# Patient Record
Sex: FEMALE | Race: ASIAN | NOT HISPANIC OR LATINO | Employment: OTHER | URBAN - METROPOLITAN AREA
[De-identification: names, ages, dates, MRNs, and addresses within clinical notes are randomized per-mention and may not be internally consistent; named-entity substitution may affect disease eponyms.]

---

## 2019-04-18 ENCOUNTER — OFFICE VISIT (OUTPATIENT)
Dept: URGENT CARE | Facility: URGENT CARE | Age: 39
End: 2019-04-18
Payer: COMMERCIAL

## 2019-04-18 VITALS
RESPIRATION RATE: 16 BRPM | WEIGHT: 123 LBS | HEIGHT: 60 IN | HEART RATE: 81 BPM | OXYGEN SATURATION: 99 % | TEMPERATURE: 97.6 F | SYSTOLIC BLOOD PRESSURE: 121 MMHG | DIASTOLIC BLOOD PRESSURE: 73 MMHG | BODY MASS INDEX: 24.15 KG/M2

## 2019-04-18 DIAGNOSIS — R76.11 POSITIVE TB TEST: ICD-10-CM

## 2019-04-18 DIAGNOSIS — L50.9 URTICARIA: Primary | ICD-10-CM

## 2019-04-18 DIAGNOSIS — T50.905A ADVERSE EFFECT OF DRUG, INITIAL ENCOUNTER: ICD-10-CM

## 2019-04-18 PROCEDURE — 99204 OFFICE O/P NEW MOD 45 MIN: CPT | Performed by: PHYSICIAN ASSISTANT

## 2019-04-18 RX ORDER — ISONIAZID 100 MG/1
100 TABLET ORAL DAILY
COMMUNITY
End: 2019-08-06

## 2019-04-18 RX ORDER — MULTIVITAMIN WITH IRON
100 TABLET ORAL DAILY
COMMUNITY
End: 2019-08-06

## 2019-04-18 RX ORDER — METHYLPREDNISOLONE 4 MG
TABLET, DOSE PACK ORAL
Qty: 21 TABLET | Refills: 0 | Status: SHIPPED | OUTPATIENT
Start: 2019-04-18 | End: 2019-08-06

## 2019-04-18 RX ORDER — HYDROXYZINE HYDROCHLORIDE 25 MG/1
25 TABLET, FILM COATED ORAL 3 TIMES DAILY PRN
Qty: 30 TABLET | Refills: 0 | Status: SHIPPED | OUTPATIENT
Start: 2019-04-18 | End: 2019-08-06

## 2019-04-18 ASSESSMENT — MIFFLIN-ST. JEOR: SCORE: 1164.42

## 2019-04-18 ASSESSMENT — PAIN SCALES - GENERAL: PAINLEVEL: NO PAIN (0)

## 2019-04-18 NOTE — PATIENT INSTRUCTIONS
(L50.9) Urticaria  (primary encounter diagnosis)  Comment:   Plan: methylPREDNISolone (MEDROL DOSEPAK) 4 MG tablet        therapy pack, hydrOXYzine (ATARAX) 25 MG tablet            (V46.260X) Adverse effect of drug, initial encounter  Comment: the hives (urticaria) are likely secondary to one of your TB medications.   Plan: methylPREDNISolone (MEDROL DOSEPAK) 4 MG tablet        therapy pack    Contact the physician who prescribed your TB medications today to let them know that we suspect you are having an allergy to one of them.  They will need to advise you on how to proceed with your TB treatment (if you should stop one or both of them, or what to do)

## 2019-04-18 NOTE — PROGRESS NOTES
Patient presents with:  Hives: Patient states she has been experiencing itchiness and hives on her whole body times 4 days       SUBJECTIVE:  Antonia De La Cruz is a 38 year old female who presents to the clinic today for an itchy rash, generalized for the past week.  Worsening.    Is on 2 TB drugs for the past month.    She lives in [place], but is in [place] on vacation.  Will be moving to [place] soon however.    She travels back to [place] on 4/21/19    Denies any shortness of breath or difficulty swallowing.    She has taken benadryl with intermittent relief.      No past medical history on file.     PMH:  Recent positive TB test, inactive.      FH:  Denies any significant FH     No Known Allergies  Social History     Tobacco Use     Smoking status: Never Smoker     Smokeless tobacco: Never Used   Substance Use Topics     Alcohol use: Not on file       ROS:  CONSTITUTIONAL:NEGATIVE for fever, chills, change in weight  INTEGUMENTARY/SKIN: as above  EYES: NEGATIVE for vision changes or irritation  ENT/MOUTH: NEGATIVE for ear, mouth and throat problems  RESP:NEGATIVE for significant cough or SOB  CV: NEGATIVE for chest pain, palpitations or peripheral edema  GI: NEGATIVE for nausea, abdominal pain, heartburn, or change in bowel habits  Review of systems negative except as stated above.    EXAM:   /73 (BP Location: Right arm, Patient Position: Sitting, Cuff Size: Adult Regular)   Pulse 81   Temp 97.6  F (36.4  C) (Oral)   Resp 16   Ht 1.524 m (5')   Wt 55.8 kg (123 lb)   SpO2 99%   BMI 24.02 kg/m    GENERAL: alert, no acute distress.    GENERAL APPEARANCE: healthy, alert and no distress  EYES: EOMI,  PERRL, conjunctiva clear  HENT: ear canals and TM's normal.  Nose and mouth without ulcers, erythema or lesions  NECK: supple, non-tender to palpation, no adenopathy noted  RESP: lungs clear to auscultation - no rales, rhonchi or wheezes  CV: regular rates and rhythm, normal S1 S2, no murmur noted  SKIN: scattered  urticarial lesions on arms, back, abdomen    (L50.9) Urticaria  (primary encounter diagnosis)  Comment:   Plan: methylPREDNISolone (MEDROL DOSEPAK) 4 MG tablet        therapy pack, hydrOXYzine (ATARAX) 25 MG tablet            (T51.217H) Adverse effect of drug, initial encounter  Comment: the hives (urticaria) are likely secondary to one of your TB medications.   Plan: methylPREDNISolone (MEDROL DOSEPAK) 4 MG tablet        therapy pack           (R76.11) Positive TB test  Comment:   Plan:   Contact the physician who prescribed your TB medications today to let them know that we suspect you are having an allergy to one of them.  They will need to advise you on how to proceed with your TB treatment (if you should stop one or both of them, or what to do)

## 2019-08-06 ENCOUNTER — OFFICE VISIT (OUTPATIENT)
Dept: URGENT CARE | Facility: URGENT CARE | Age: 39
End: 2019-08-06
Payer: COMMERCIAL

## 2019-08-06 VITALS
SYSTOLIC BLOOD PRESSURE: 114 MMHG | RESPIRATION RATE: 19 BRPM | WEIGHT: 124.31 LBS | DIASTOLIC BLOOD PRESSURE: 70 MMHG | HEART RATE: 75 BPM | TEMPERATURE: 97.6 F | OXYGEN SATURATION: 98 % | BODY MASS INDEX: 24.28 KG/M2

## 2019-08-06 DIAGNOSIS — N39.0 ACUTE UTI: Primary | ICD-10-CM

## 2019-08-06 DIAGNOSIS — R10.9 ABDOMINAL PAIN IN FEMALE: ICD-10-CM

## 2019-08-06 DIAGNOSIS — R53.83 FATIGUE, UNSPECIFIED TYPE: ICD-10-CM

## 2019-08-06 LAB
ALBUMIN SERPL-MCNC: 4.1 G/DL (ref 3.4–5)
ALBUMIN UR-MCNC: NEGATIVE MG/DL
ALP SERPL-CCNC: 96 U/L (ref 40–150)
ALT SERPL W P-5'-P-CCNC: 24 U/L (ref 0–50)
ANION GAP SERPL CALCULATED.3IONS-SCNC: 2 MMOL/L (ref 3–14)
APPEARANCE UR: CLEAR
AST SERPL W P-5'-P-CCNC: 19 U/L (ref 0–45)
BACTERIA #/AREA URNS HPF: ABNORMAL /HPF
BASOPHILS # BLD AUTO: 0 10E9/L (ref 0–0.2)
BASOPHILS NFR BLD AUTO: 0.4 %
BILIRUB SERPL-MCNC: 0.3 MG/DL (ref 0.2–1.3)
BILIRUB UR QL STRIP: NEGATIVE
BUN SERPL-MCNC: 11 MG/DL (ref 7–30)
CALCIUM SERPL-MCNC: 8.9 MG/DL (ref 8.5–10.1)
CHLORIDE SERPL-SCNC: 110 MMOL/L (ref 94–109)
CO2 SERPL-SCNC: 27 MMOL/L (ref 20–32)
COLOR UR AUTO: YELLOW
CREAT SERPL-MCNC: 0.69 MG/DL (ref 0.52–1.04)
DIFFERENTIAL METHOD BLD: NORMAL
EOSINOPHIL # BLD AUTO: 0.1 10E9/L (ref 0–0.7)
EOSINOPHIL NFR BLD AUTO: 0.8 %
ERYTHROCYTE [DISTWIDTH] IN BLOOD BY AUTOMATED COUNT: 13.6 % (ref 10–15)
GFR SERPL CREATININE-BSD FRML MDRD: >90 ML/MIN/{1.73_M2}
GLUCOSE SERPL-MCNC: 68 MG/DL (ref 70–99)
GLUCOSE UR STRIP-MCNC: NEGATIVE MG/DL
HCG UR QL: NEGATIVE
HCT VFR BLD AUTO: 37.9 % (ref 35–47)
HGB BLD-MCNC: 12.1 G/DL (ref 11.7–15.7)
HGB UR QL STRIP: ABNORMAL
HYALINE CASTS #/AREA URNS LPF: ABNORMAL /LPF
KETONES UR STRIP-MCNC: NEGATIVE MG/DL
LEUKOCYTE ESTERASE UR QL STRIP: ABNORMAL
LYMPHOCYTES # BLD AUTO: 2 10E9/L (ref 0.8–5.3)
LYMPHOCYTES NFR BLD AUTO: 28.1 %
MCH RBC QN AUTO: 28.4 PG (ref 26.5–33)
MCHC RBC AUTO-ENTMCNC: 31.9 G/DL (ref 31.5–36.5)
MCV RBC AUTO: 89 FL (ref 78–100)
MONOCYTES # BLD AUTO: 0.9 10E9/L (ref 0–1.3)
MONOCYTES NFR BLD AUTO: 12.1 %
MUCOUS THREADS #/AREA URNS LPF: PRESENT /LPF
NEUTROPHILS # BLD AUTO: 4.3 10E9/L (ref 1.6–8.3)
NEUTROPHILS NFR BLD AUTO: 58.6 %
NITRATE UR QL: NEGATIVE
NON-SQ EPI CELLS #/AREA URNS LPF: ABNORMAL /LPF
PH UR STRIP: 6 PH (ref 5–7)
PLATELET # BLD AUTO: 244 10E9/L (ref 150–450)
POTASSIUM SERPL-SCNC: 4.7 MMOL/L (ref 3.4–5.3)
PROT SERPL-MCNC: 8.3 G/DL (ref 6.8–8.8)
RBC # BLD AUTO: 4.26 10E12/L (ref 3.8–5.2)
RBC #/AREA URNS AUTO: ABNORMAL /HPF
SODIUM SERPL-SCNC: 139 MMOL/L (ref 133–144)
SOURCE: ABNORMAL
SP GR UR STRIP: 1.01 (ref 1–1.03)
SPECIMEN SOURCE: NORMAL
TSH SERPL DL<=0.005 MIU/L-ACNC: 0.72 MU/L (ref 0.4–4)
UROBILINOGEN UR STRIP-ACNC: 0.2 EU/DL (ref 0.2–1)
WBC # BLD AUTO: 7.3 10E9/L (ref 4–11)
WBC #/AREA URNS AUTO: ABNORMAL /HPF
WET PREP SPEC: NORMAL

## 2019-08-06 PROCEDURE — 85025 COMPLETE CBC W/AUTO DIFF WBC: CPT | Performed by: PHYSICIAN ASSISTANT

## 2019-08-06 PROCEDURE — 36415 COLL VENOUS BLD VENIPUNCTURE: CPT | Performed by: PHYSICIAN ASSISTANT

## 2019-08-06 PROCEDURE — 81025 URINE PREGNANCY TEST: CPT | Performed by: PHYSICIAN ASSISTANT

## 2019-08-06 PROCEDURE — 84443 ASSAY THYROID STIM HORMONE: CPT | Performed by: PHYSICIAN ASSISTANT

## 2019-08-06 PROCEDURE — 99214 OFFICE O/P EST MOD 30 MIN: CPT | Performed by: PHYSICIAN ASSISTANT

## 2019-08-06 PROCEDURE — 81001 URINALYSIS AUTO W/SCOPE: CPT | Performed by: PHYSICIAN ASSISTANT

## 2019-08-06 PROCEDURE — 80053 COMPREHEN METABOLIC PANEL: CPT | Performed by: PHYSICIAN ASSISTANT

## 2019-08-06 PROCEDURE — 87210 SMEAR WET MOUNT SALINE/INK: CPT | Performed by: PHYSICIAN ASSISTANT

## 2019-08-06 RX ORDER — NITROFURANTOIN 25; 75 MG/1; MG/1
100 CAPSULE ORAL 2 TIMES DAILY
Qty: 10 CAPSULE | Refills: 0 | Status: SHIPPED | OUTPATIENT
Start: 2019-08-06 | End: 2019-08-11

## 2019-08-06 RX ORDER — PHENAZOPYRIDINE HYDROCHLORIDE 100 MG/1
100 TABLET, FILM COATED ORAL 3 TIMES DAILY PRN
Qty: 12 TABLET | Refills: 0 | Status: SHIPPED | OUTPATIENT
Start: 2019-08-06 | End: 2020-02-19

## 2019-08-06 NOTE — PROGRESS NOTES
"Patient presents with:  Urgent Care: bloated, stomach pain, bladder pain radiating to rectal area, shaky and fatigue off/on for one year.      SUBJECTIVE  HPI:  Antonia De La Cruz is a 39 year old female who presents with the CC of abdominal/pelvic pain.    Complains of lower abdominal pain and urinary frequency for the past couple of weeks.      No vaginal discharge.      Sometimes epigastric pain every night for the past week.      Feels shakey after eating lunch for the past week.  She states that in the past she has been told that she may have times of \"low sugar\" and should eat many small meals throughout the day.  She has NOT been doing this.    Also complains of fatigue over the past few months.  No weight loss.      No rashes.      Denies any nausea or vomiting.    Last BM was within the past day, normal.  No blood.      ,   Using withdrawal for contraception.      LMP 7/15/19    SH: moved to [place] from [place] late last spring.      PMH: completed treatment for positive mantoux.      No past medical history on file.  Current Outpatient Medications   Medication Sig Dispense Refill     nitroFURantoin macrocrystal-monohydrate (MACROBID) 100 MG capsule Take 1 capsule (100 mg) by mouth 2 times daily for 5 days 10 capsule 0     phenazopyridine (PYRIDIUM) 100 MG tablet Take 1 tablet (100 mg) by mouth 3 times daily as needed 12 tablet 0     Social History     Tobacco Use     Smoking status: Never Smoker     Smokeless tobacco: Never Used   Substance Use Topics     Alcohol use: Not on file     FH:   Diabetes in family      ROS:  CONSTITUTIONAL:NEGATIVE for fever, chills, change in weight  INTEGUMENTARY/SKIN: NEGATIVE for worrisome rashes, moles or lesions  ENT/MOUTH: NEGATIVE for ear, mouth and throat problems  RESP:NEGATIVE for significant cough or SOB  CV: NEGATIVE for chest pain, palpitations or peripheral edema  GI: as per HPI  : normal menstrual cycles  MUSCULOSKELETAL: NEGATIVE for significant arthralgias or " myalgia  NEURO: NEGATIVE for weakness, dizziness or paresthesias  ENDOCRINE: NEGATIVE for temperature intolerance, skin/hair changes  HEME/ALLERGY/IMMUNE: NEGATIVE for bleeding problems  Review of systems negative except as stated above.    OBJECTIVE:  /70   Pulse 75   Temp 97.6  F (36.4  C) (Oral)   Resp 19   Wt 56.4 kg (124 lb 5 oz)   SpO2 98%   BMI 24.28 kg/m    GENERAL APPEARANCE: healthy, alert and no distress  EYES: EOMI,  PERRL, conjunctiva clear  HENT: ear canals and TM's normal.  Nose and mouth without ulcers, erythema or lesions  NECK: supple, nontender, no lymphadenopathy  RESP: lungs clear to auscultation - no rales, rhonchi or wheezes  CV: regular rates and rhythm, normal S1 S2, no murmur noted  ABDOMEN:  soft, nontender, no HSM or masses and bowel sounds normal  BACK: no CVAT  NEURO: Normal strength and tone, sensory exam grossly normal,  normal speech and mentation  SKIN: no suspicious lesions or rashes  (N39.0) Acute UTI  (primary encounter diagnosis)  Comment:   Plan: nitroFURantoin macrocrystal-monohydrate         (MACROBID) 100 MG capsule, phenazopyridine         (PYRIDIUM) 100 MG tablet            (R10.9) Abdominal pain in female  Comment: likely secondary to urinary tract infection.    Plan: UA with Microscopic reflex to Culture, Wet         prep, HCG Qual, Urine ([id number]), Comprehensive         metabolic panel (BMP + Alb, Alk Phos, ALT, AST,        Total. Bili, TP), CBC with platelets and         differential            (R53.83) Fatigue, unspecified type  Comment:   Plan: TSH with free T4 reflex          Establish care and follow up with Internal medicine for fatigue and also follow up with Gynecology for pelvic symptoms.      Greater than 50% of the 25 minutes spent face to face with patient was discussing and reviewing the results of diagnostic tests, discussing risks and benefits of management (treatment) options, instruction for management and follow up and importance of  compliance with treatment.        Follow up within the next month.  Sooner should symptoms persist or worsen.

## 2019-08-06 NOTE — PATIENT INSTRUCTIONS
(N39.0) Acute UTI  (primary encounter diagnosis)  Comment:   Plan: nitroFURantoin macrocrystal-monohydrate         (MACROBID) 100 MG capsule, phenazopyridine         (PYRIDIUM) 100 MG tablet            (R10.9) Abdominal pain in female  Comment: likely secondary to urinary tract infection.    Plan: UA with Microscopic reflex to Culture, Wet         prep, HCG Qual, Urine ([id number]), Comprehensive         metabolic panel (BMP + Alb, Alk Phos, ALT, AST,        Total. Bili, TP), CBC with platelets and         differential            (R53.83) Fatigue, unspecified type  Comment:   Plan: TSH with free T4 reflex          Establish care and follow up with Internal medicine for fatigue and also follow up with Gynecology for pelvic symptoms.      Follow up within the next month.  Sooner should symptoms persist or worsen.

## 2019-09-16 ENCOUNTER — OFFICE VISIT (OUTPATIENT)
Dept: URGENT CARE | Facility: URGENT CARE | Age: 39
End: 2019-09-16
Payer: COMMERCIAL

## 2019-09-16 VITALS
BODY MASS INDEX: 23.1 KG/M2 | TEMPERATURE: 98 F | WEIGHT: 118.3 LBS | SYSTOLIC BLOOD PRESSURE: 120 MMHG | OXYGEN SATURATION: 99 % | DIASTOLIC BLOOD PRESSURE: 78 MMHG | HEART RATE: 79 BPM

## 2019-09-16 DIAGNOSIS — H10.33 ACUTE CONJUNCTIVITIS OF BOTH EYES, UNSPECIFIED ACUTE CONJUNCTIVITIS TYPE: Primary | ICD-10-CM

## 2019-09-16 PROCEDURE — 99213 OFFICE O/P EST LOW 20 MIN: CPT | Performed by: FAMILY MEDICINE

## 2019-09-16 RX ORDER — OLOPATADINE HYDROCHLORIDE 1 MG/ML
1 SOLUTION/ DROPS OPHTHALMIC 2 TIMES DAILY
Qty: 1 ML | Refills: 0 | Status: SHIPPED | OUTPATIENT
Start: 2019-09-16 | End: 2020-02-19

## 2019-09-16 RX ORDER — TOBRAMYCIN 3 MG/ML
2 SOLUTION/ DROPS OPHTHALMIC 4 TIMES DAILY
Qty: 3 ML | Refills: 0 | Status: SHIPPED | OUTPATIENT
Start: 2019-09-16 | End: 2020-02-19

## 2019-09-16 NOTE — PROGRESS NOTES
SUBJECTIVE:Chief Complaint:   Chief Complaint   Patient presents with     Urgent Care     Pt states bilateral eye discharge, swelling, redness sxs x last night       History of Present Illness: Antonia De La Cruz is a 39 year old female who presents complaining of both eyes mattering and redness for Today.  Onset/timing: gradual.   Associated Signs and Symptoms: none   Treatment measures tried include: none   Contact wearer : No    No past medical history on file.  No Known Allergies  Social History     Tobacco Use     Smoking status: Never Smoker     Smokeless tobacco: Never Used   Substance Use Topics     Alcohol use: Not on file       ROS:  negative for photophobia, pain, vision change    OBJECTIVE:  /78   Pulse 79   Temp 98  F (36.7  C) (Oral)   Wt 53.7 kg (118 lb 4.8 oz)   SpO2 99%   BMI 23.10 kg/m     General: no acute distress  Eye exam: right eye abnormal findings: conjunctivitis with erythema, discharge and matting noted, left eye abnormal findings: conjunctivitis with erythema, discharge and matting noted.  TAL, EOMI, fundi normal, corneas normal, no foreign bodies, visual acuity normal both eyes, no periorbital cellulitis      ICD-10-CM    1. Acute conjunctivitis of both eyes, unspecified acute conjunctivitis type H10.33 olopatadine (PATANOL) 0.1 % ophthalmic solution     tobramycin (TOBREX) 0.3 % ophthalmic solution

## 2019-11-29 ENCOUNTER — OFFICE VISIT (OUTPATIENT)
Dept: FAMILY MEDICINE | Facility: CLINIC | Age: 39
End: 2019-11-29
Payer: COMMERCIAL

## 2019-11-29 VITALS
DIASTOLIC BLOOD PRESSURE: 84 MMHG | TEMPERATURE: 98.8 F | WEIGHT: 118 LBS | OXYGEN SATURATION: 100 % | SYSTOLIC BLOOD PRESSURE: 112 MMHG | BODY MASS INDEX: 23.05 KG/M2 | RESPIRATION RATE: 12 BRPM | HEART RATE: 77 BPM

## 2019-11-29 DIAGNOSIS — R30.0 DYSURIA: Primary | ICD-10-CM

## 2019-11-29 LAB
BACTERIA #/AREA URNS HPF: ABNORMAL /HPF
CAOX CRY #/AREA URNS HPF: ABNORMAL /HPF
MUCOUS THREADS #/AREA URNS LPF: PRESENT /LPF
NON-SQ EPI CELLS #/AREA URNS LPF: ABNORMAL /LPF
RBC #/AREA URNS AUTO: ABNORMAL /HPF
WBC #/AREA URNS AUTO: ABNORMAL /HPF

## 2019-11-29 PROCEDURE — 81015 MICROSCOPIC EXAM OF URINE: CPT | Performed by: PHYSICIAN ASSISTANT

## 2019-11-29 PROCEDURE — 99214 OFFICE O/P EST MOD 30 MIN: CPT | Performed by: PHYSICIAN ASSISTANT

## 2019-11-29 RX ORDER — SULFAMETHOXAZOLE/TRIMETHOPRIM 800-160 MG
1 TABLET ORAL 2 TIMES DAILY
Qty: 14 TABLET | Refills: 0 | Status: SHIPPED | OUTPATIENT
Start: 2019-11-29 | End: 2020-02-19

## 2019-12-05 ENCOUNTER — OFFICE VISIT (OUTPATIENT)
Dept: URGENT CARE | Facility: URGENT CARE | Age: 39
End: 2019-12-05
Payer: COMMERCIAL

## 2019-12-05 VITALS — OXYGEN SATURATION: 100 % | DIASTOLIC BLOOD PRESSURE: 64 MMHG | HEART RATE: 60 BPM | SYSTOLIC BLOOD PRESSURE: 108 MMHG

## 2019-12-05 DIAGNOSIS — L71.0 PERIORAL DERMATITIS: Primary | ICD-10-CM

## 2019-12-05 PROCEDURE — 99213 OFFICE O/P EST LOW 20 MIN: CPT | Performed by: PHYSICIAN ASSISTANT

## 2019-12-05 ASSESSMENT — ENCOUNTER SYMPTOMS
DIARRHEA: 0
ABDOMINAL PAIN: 0
CHILLS: 0
FOCAL WEAKNESS: 0
NAUSEA: 0
VOMITING: 0
SHORTNESS OF BREATH: 0
FEVER: 0
HEADACHES: 0

## 2019-12-05 NOTE — PATIENT INSTRUCTIONS
What is perioral dermatitis?    Perioral (or periorificial) dermatitis is a common acne or rosacea-like rash that develops around the mouth, nose and eyes of children and young adults.    WHAT CAUSES PERIORAL DERMATITIS?    We don t know the exact cause of perioral dermatitis. Sometimes perioral dermatitis is triggered by steroid medicines that are taken by mouth, applied to the skin or inhaled. One possible cause is an overgrowth of normal skin mites and yeast.    PERIORAL DERMATITIS FACTS      Perioral dermatitis looks like many tiny pink or skin-colored bumps that usually come close to the lips, but don t go onto the lips.    Perioral dermatitis may appear at any age in childhood and adolescence. Girls and boys both get it.    The rash of perioral dermatitis is usually not very bothersome, although it can cause mild itching or burning.    Many people with perioral dermatitis have a history of eczema or asthma. This may be because patients with eczema and asthma need to use steroid medications (and may have skin barrier problems).    Topical steroids may at first seem like they help perioral dermatitis, but the rash often comes back and may even get worse as soon as topical steroids are stopped. Because of this, many people want to start the topical steroids again, but it is important to try to break this cycle.    HOW IS PERIORAL DERMATITIS DIAGNOSED?    Your doctor will be able to diagnose perioral dermatitis by talking with you and doing a careful skin examination. Sometimes tests may be necessary to rule out other causes.    HOW IS PERIORAL DERMATITIS TREATED?    There are many ways to treat perioral dermatitis, and sometimes you need to try several different medications before finding the one that works best for you. The rash needs to be treated for at least 3-6 weeks to fully improve. Your doctor will decide which medications to start with based on how severe the rash is and which treatments have helped  before. The following treatments have all been used to successfully clear perioral dermatitis:    Remove Triggers  If you are using topical steroids to treat perioral dermatitis, you should talk with your doctor about how to stop them. Even with a slow taper, there may be a temporary flare of the rash. If you need inhaled or oral steroids for other health conditions, you should continue them. Take care to keep inhaled or nasal steroids from touching the skin. If they do touch the skin, wipe them off right away. If possible, talk to your doctor about switching from a mask to a spacer to inhale steroids, as this can help avoid contact with the skin.    Topical Antibiotics  Topical antibiotics are usually the starting point in treating perioral dermatitis. Examples of topical antibiotics include metronidazole, clindamycin, erythromycin, sulfacetamide and azelaic acid.    Topical Non-Steroid Anti-Inflammatory Creams  Topical non-steroid anti-inflammatory creams help calm down the inflammation in the skin. Examples are pimecrolimus cream and tacrolimus ointment. Some people say that they feel a mild burning with the first few uses, but this tends to go away.    Anti-Mite Therapies  Anti-mite creams like permethrin or ivermectin may be used to treat perioral dermatitis. Some patients have mild peeling after use.    Oral Antibiotics  If perioral dermatitis is severe or does not respond to topical creams, your doctor may prescribe an oral antibiotic. Oral antibiotics work because they help reduce inflammation. Adults and older children with perioral dermatitis are often treated with tetracyclines, but these are rarely used in children under the age of 8 because they can permanently stain the teeth. Oral antibiotics used for young children are azithromycin, erythromycin and clarithromycin.    WHAT SHOULD BE EXPECTED AFTER TREATMENT?    Even after the rash clears with the right treatment, there is still a chance the perioral  dermatitis may eventually come back. Scars from the rash are unlikely but have been seen in a few patients. Follow up with your doctor regularly and let your doctor know if the rash comes back.      Contributing SPD Members:  Soham Crockett MD, Rojas Casas MD    Committee Reviewers:  John aBrnes MD, Rigoberto Rider MD, Carolee Farrell MD    Expert Reviewer:  Jeremias Diaz MD    The Society for Pediatric Dermat[employer] and [employer] cannot be held responsible for any errors or for any consequences arising from the use of the information contained in this handout. Handout originally published in Pediatric Dermatology: Vol. 34, No. 5 (2017).      2017 The Society for Pediatric Dermatology

## 2019-12-05 NOTE — PROGRESS NOTES
HPI  December 5, 2019    HPI: Antonia De La Cruz is a 39 year old female who complains of rash onset 1 month ago. Rash is localized to area around her mouth. It is not pruritic or painful. Pt denies contacts with anyone with a similar rash. Rash started about 1 month after pt used Plan B and also changed her face wash. She was prescribed Differin which seemed to make it worse, and also tried a product from [employer] with salicylic acid in it which also seemed to make it worse. Symptoms are moderate in severity & constant in duration. Denies throat/tongue swelling, CP, SOB, abd pain, N/VD, sore throat, and any other symptoms.    Prescribed Differin by Corie and also tried product with salicylic acid from [employer] but both seemed to make it worse    No past medical history on file.  No past surgical history on file.  Social History     Tobacco Use     Smoking status: Never Smoker     Smokeless tobacco: Never Used   Substance Use Topics     Alcohol use: None     Drug use: None       Problem list, Medication list, Allergies, and Medical/Social/Surgical histories reviewed in [facility] and updated as appropriate.      Review of Systems   Constitutional: Negative for chills and fever.   Respiratory: Negative for shortness of breath.    Cardiovascular: Negative for chest pain.   Gastrointestinal: Negative for abdominal pain, diarrhea, nausea and vomiting.   Skin: Positive for rash.   Neurological: Negative for focal weakness and headaches.   All other systems reviewed and are negative.        Physical Exam  Vitals signs and nursing note reviewed.   HENT:      Head: Normocephalic and atraumatic.      Mouth/Throat:      Mouth: No oral lesions.   Cardiovascular:      Rate and Rhythm: Normal rate and regular rhythm.      Heart sounds: Normal heart sounds.   Pulmonary:      Effort: Pulmonary effort is normal.      Breath sounds: Normal breath sounds.   Musculoskeletal: Normal range of motion.   Skin:     General: Skin is warm and dry.       Findings: Rash present. Rash is papular and pustular.      Comments: Papules & pustules to perioral area   Neurological:      Mental Status: She is alert and oriented to person, place, and time.       Vital Signs  /64   Pulse 60   SpO2 100%      Diagnostic Test Results:  none     ASSESSMENT/PLAN      ICD-10-CM    1. Perioral dermatitis L71.0 metroNIDAZOLE (METROCREAM) 0.75 % external cream      Suspect perioral dermatitis. Advised avoidance of all products on perioral area except metronidazole cream once daily. Discussed it will likely take several weeks to see improvement.      I have discussed any lab or imaging results, the patient's diagnosis, and my plan of treatment with the patient and/or family. Patient is aware to come back in if with worsening symptoms or if no relief despite treatment plan.  Patient voiced understanding and had no further questions.       Follow Up: Return in about 1 month (around 1/5/2020) for Follow up w/ primary care provider if not better.    LYNDA Escudero, PA-C  [facility]

## 2020-02-19 ENCOUNTER — OFFICE VISIT (OUTPATIENT)
Dept: INTERNAL MEDICINE | Facility: CLINIC | Age: 40
End: 2020-02-19
Payer: COMMERCIAL

## 2020-02-19 ENCOUNTER — RESULT FOLLOW UP (OUTPATIENT)
Dept: INTERNAL MEDICINE | Facility: CLINIC | Age: 40
End: 2020-02-19

## 2020-02-19 VITALS
WEIGHT: 119.6 LBS | OXYGEN SATURATION: 99 % | BODY MASS INDEX: 24.11 KG/M2 | SYSTOLIC BLOOD PRESSURE: 112 MMHG | DIASTOLIC BLOOD PRESSURE: 78 MMHG | RESPIRATION RATE: 16 BRPM | HEART RATE: 83 BPM | HEIGHT: 59 IN

## 2020-02-19 DIAGNOSIS — Z12.4 SCREENING FOR CERVICAL CANCER: ICD-10-CM

## 2020-02-19 DIAGNOSIS — Z00.00 ROUTINE HISTORY AND PHYSICAL EXAMINATION OF ADULT: Primary | ICD-10-CM

## 2020-02-19 DIAGNOSIS — Z78.9 VARICELLA VACCINATION STATUS UNKNOWN: ICD-10-CM

## 2020-02-19 DIAGNOSIS — Z78.9 MEASLES, MUMPS, RUBELLA (MMR) VACCINATION STATUS UNKNOWN: ICD-10-CM

## 2020-02-19 DIAGNOSIS — Z13.220 SCREENING FOR CHOLESTEROL LEVEL: ICD-10-CM

## 2020-02-19 DIAGNOSIS — Z23 NEED FOR TETANUS BOOSTER: ICD-10-CM

## 2020-02-19 DIAGNOSIS — Z78.9 HEPATITIS B VACCINATION STATUS UNKNOWN: ICD-10-CM

## 2020-02-19 DIAGNOSIS — Z11.1 SCREENING EXAMINATION FOR PULMONARY TUBERCULOSIS: ICD-10-CM

## 2020-02-19 DIAGNOSIS — Z13.1 SCREENING FOR DIABETES MELLITUS: ICD-10-CM

## 2020-02-19 DIAGNOSIS — Z11.4 SCREENING FOR HUMAN IMMUNODEFICIENCY VIRUS WITHOUT PRESENCE OF RISK FACTORS: ICD-10-CM

## 2020-02-19 DIAGNOSIS — Z30.09 BIRTH CONTROL COUNSELING: ICD-10-CM

## 2020-02-19 LAB — HBV SURFACE AB SERPL IA-ACNC: 183.29 M[IU]/ML

## 2020-02-19 PROCEDURE — 86580 TB INTRADERMAL TEST: CPT | Performed by: INTERNAL MEDICINE

## 2020-02-19 PROCEDURE — 86762 RUBELLA ANTIBODY: CPT | Performed by: INTERNAL MEDICINE

## 2020-02-19 PROCEDURE — 90471 IMMUNIZATION ADMIN: CPT | Performed by: INTERNAL MEDICINE

## 2020-02-19 PROCEDURE — 99395 PREV VISIT EST AGE 18-39: CPT | Mod: 25 | Performed by: INTERNAL MEDICINE

## 2020-02-19 PROCEDURE — 87624 HPV HI-RISK TYP POOLED RSLT: CPT | Performed by: INTERNAL MEDICINE

## 2020-02-19 PROCEDURE — G0145 SCR C/V CYTO,THINLAYER,RESCR: HCPCS | Performed by: INTERNAL MEDICINE

## 2020-02-19 PROCEDURE — 90715 TDAP VACCINE 7 YRS/> IM: CPT | Performed by: INTERNAL MEDICINE

## 2020-02-19 PROCEDURE — 86735 MUMPS ANTIBODY: CPT | Performed by: INTERNAL MEDICINE

## 2020-02-19 PROCEDURE — 86787 VARICELLA-ZOSTER ANTIBODY: CPT | Performed by: INTERNAL MEDICINE

## 2020-02-19 PROCEDURE — 86706 HEP B SURFACE ANTIBODY: CPT | Performed by: INTERNAL MEDICINE

## 2020-02-19 PROCEDURE — 36415 COLL VENOUS BLD VENIPUNCTURE: CPT | Performed by: INTERNAL MEDICINE

## 2020-02-19 PROCEDURE — 86765 RUBEOLA ANTIBODY: CPT | Performed by: INTERNAL MEDICINE

## 2020-02-19 ASSESSMENT — MIFFLIN-ST. JEOR: SCORE: 1120.19

## 2020-02-19 NOTE — PROGRESS NOTES
SUBJECTIVE                                                      HPI: Antonia De La Cruz is a pleasant 39 year old female who presents for a physical.    No specific complaints, concerns, or questions.    ROS:  Constitutional: denies unintentional weight loss or gain; denies fevers, chills, or sweats     Cardiovascular: denies chest pain, palpitations, or edema  Respiratory: denies cough, wheezing, shortness of breath, or dyspnea on exertion  Gastrointestinal: denies nausea, vomiting, constipation, diarrhea, or abdominal pain  Genitourinary: denies urinary frequency, urgency, dysuria, or hematuria  Integumentary: denies rash or pruritus  Musculoskeletal: denies back pain, muscle pain, joint pain, or joint swelling  Neurologic: denies focal weakness, numbness, or tingling  Hematologic/Immunologic: denies history of anemia or blood transfusions  Endocrine: denies heat or cold intolerance; denies polyuria, polydipsia  Psychiatric: denies anxiety; see preventative health below    Past Medical History:   Diagnosis Date     NO ACTIVE PROBLEMS      Past Surgical History:   Procedure Laterality Date     NO HISTORY OF SURGERY       Family History   Problem Relation Age of Onset     Diabetes Type 2  Father      Hypertension Father      Hyperlipidemia Father      Myocardial Infarction No family hx of      Cerebrovascular Disease No family hx of      Coronary Artery Disease Early Onset No family hx of      Breast Cancer No family hx of      Ovarian Cancer No family hx of      Colon Cancer No family hx of      Social History     Occupational History     Occupation:       Comment: [employer]   Tobacco Use     Smoking status: Never Smoker     Smokeless tobacco: Never Used   Substance and Sexual Activity     Alcohol use: Not Currently     Drug use: Not Currently     Sexual activity: Yes     Partners: Male     Birth control/protection: Pull-out method   Social History Narrative     ".    Two kids (10 and 8 as of 2020).    Walks regularly.      No Known Allergies     MEDS: None    Immunization History   Administered Date(s) Administered     Influenza Vaccine IM > 6 months Valent IIV4 11/12/2019     OBJECTIVE                                                      /78   Pulse 83   Resp 16   Ht 1.486 m (4' 10.5\")   Wt 54.3 kg (119 lb 9.6 oz)   LMP 02/07/2020 (Exact Date)   SpO2 99%   BMI 24.57 kg/m    Constitutional: well-appearing  Eyes: normal conjunctivae and lids; pupils equal, round, and reactive to light  Ears, Nose, Mouth, and Throat: normal ears and nose; tympanic membranes visualized and normal; normal lips, teeth, and gums; no oropharyngeal lesions or ulcers  Neck: supple and symmetric; no lymphadenopathy; no thyromegaly or masses  Respiratory: normal respiratory effort; clear to auscultation bilaterally  Cardiovascular: regular rate and rhythm; pedal pulses palpable; no edema  Gastrointestinal: soft, non-tender, non-distended, and bowel sounds present; no organomegaly or masses  Musculoskeletal: normal gait and station  Psych: normal judgment and insight; normal mood and affect; recent and remote memory intact; oriented to time, place, and person    PREVENTATIVE HEALTH                                                      BMI: within normal limits   Blood pressure: within normal limits   Breast CA screening: not medically indicated at this time   Cervical CA screening: DUE  Colon CA screening: not medically indicated at this time   Lung CA screening: n/a   Dexa: not medically indicated at this time   Screening HCV: n/a   Screening HIV: DUE  Screening cholesterol: DUE  Screening diabetes: DUE  STD testing: no risk factors present  Depression screening: PHQ-2 assessment completed and reviewed - no intervention indicated at this time  Alcohol misuse screening: alcohol use reviewed - no intervention indicated at this time  Immunizations: reviewed; TDAP DUE    ASSESSMENT/PLAN "                                                       (Z00.00) Routine history and physical examination of adult  (primary encounter diagnosis)  Comment: PMH, PSH, FH, SH, medications, allergies, immunizations, and preventative health measures reviewed.   Plan: PMH, PSH, FH, SH, medications, allergies, immunizations, and preventative health measures reviewed.     (Z12.4) Screening for cervical cancer  Plan: pap smear obtained today.    (Z23) Need for tetanus booster  Plan: TDAP given today.     (Z11.1) Screening examination for pulmonary tuberculosis  Plan: PPD placed today; nurse read in 48-72 hours.    (Z78.9) Hepatitis B vaccination status unknown  (Z78.9) Measles, mumps, rubella (MMR) vaccination status unknown  (Z78.9) Varicella vaccination status unknown  Plan: titers today.    (Z13.220) Screening for cholesterol level  (Z13.1) Screening for diabetes mellitus  (Z11.4) Screening for human immunodeficiency virus without presence of risk factors  Plan: patient will return for fasting lab when able.     (Z30.09) Birth control counseling  Plan: referred to ob/gyn to discuss tubal ligation.     The instructions on the AVS were discussed and explained to the patient. Patient expressed understanding of instructions.    (Chart documentation was completed, in part, with [employer] voice-recognition software. Even though reviewed, some grammatical, spelling, and word errors may remain.)    Nora Morataya MD   [address] [address]  T: [phone], F: [phone]

## 2020-02-19 NOTE — PATIENT INSTRUCTIONS
Tetanus booster today.    ---    Labs downstairs today.    Schedule fasting labs while you're down there.    ---    Pap smear results take ~1 week to come back.     ---    Ob/gyn referral placed - please call to schedule.

## 2020-02-20 LAB
MEV IGG SER QL IA: 4.8 AI (ref 0–0.8)
MUV IGG SER QL IA: 1.8 AI (ref 0–0.8)
RUBV IGG SERPL IA-ACNC: 60 IU/ML
VZV IGG SER QL IA: 2.6 AI (ref 0–0.8)

## 2020-02-21 ENCOUNTER — ANCILLARY PROCEDURE (OUTPATIENT)
Dept: GENERAL RADIOLOGY | Facility: CLINIC | Age: 40
End: 2020-02-21
Attending: INTERNAL MEDICINE
Payer: COMMERCIAL

## 2020-02-21 ENCOUNTER — ALLIED HEALTH/NURSE VISIT (OUTPATIENT)
Dept: NURSING | Facility: CLINIC | Age: 40
End: 2020-02-21
Payer: COMMERCIAL

## 2020-02-21 DIAGNOSIS — Z11.1 SCREENING EXAMINATION FOR PULMONARY TUBERCULOSIS: ICD-10-CM

## 2020-02-21 DIAGNOSIS — Z11.1 SCREENING EXAMINATION FOR PULMONARY TUBERCULOSIS: Primary | ICD-10-CM

## 2020-02-21 LAB
COPATH REPORT: NORMAL
PAP: NORMAL
PPDINDURATION: 15 MM (ref 0–5)
PPDREDNESS: 20 MM

## 2020-02-21 PROCEDURE — 71046 X-RAY EXAM CHEST 2 VIEWS: CPT

## 2020-02-21 PROCEDURE — 99207 ZZC NO CHARGE NURSE ONLY: CPT

## 2020-02-21 NOTE — PROGRESS NOTES
"Mantoux results: Induration. Swelling.Redness noted to Left Forearm    Patient reports history of \"positive mantoux\".  She has finished INH treatment some time last year- around March, 2019. This was completed in [place], [place] by her previous employer ([employer])    Patient also noted to have left deltoid skin livier and recalls she was given vaccine as a child when in the [facility].    Message handled by Nurse Triage with [facility] - provider name: myesha Carmona for chest x-ray.      Margarita WALDRONN, RN, PHN        "

## 2020-02-25 ENCOUNTER — TELEPHONE (OUTPATIENT)
Dept: INTERNAL MEDICINE | Facility: CLINIC | Age: 40
End: 2020-02-25

## 2020-02-25 DIAGNOSIS — Z11.4 SCREENING FOR HUMAN IMMUNODEFICIENCY VIRUS WITHOUT PRESENCE OF RISK FACTORS: ICD-10-CM

## 2020-02-25 DIAGNOSIS — Z13.1 SCREENING FOR DIABETES MELLITUS: ICD-10-CM

## 2020-02-25 DIAGNOSIS — Z13.220 SCREENING FOR CHOLESTEROL LEVEL: ICD-10-CM

## 2020-02-25 LAB
ALBUMIN SERPL-MCNC: 3.6 G/DL (ref 3.4–5)
ALP SERPL-CCNC: 113 U/L (ref 40–150)
ALT SERPL W P-5'-P-CCNC: 42 U/L (ref 0–50)
ANION GAP SERPL CALCULATED.3IONS-SCNC: 5 MMOL/L (ref 3–14)
AST SERPL W P-5'-P-CCNC: 25 U/L (ref 0–45)
BILIRUB SERPL-MCNC: 0.2 MG/DL (ref 0.2–1.3)
BUN SERPL-MCNC: 12 MG/DL (ref 7–30)
CALCIUM SERPL-MCNC: 8.7 MG/DL (ref 8.5–10.1)
CHLORIDE SERPL-SCNC: 107 MMOL/L (ref 94–109)
CHOLEST SERPL-MCNC: 178 MG/DL
CO2 SERPL-SCNC: 26 MMOL/L (ref 20–32)
CREAT SERPL-MCNC: 0.53 MG/DL (ref 0.52–1.04)
FINAL DIAGNOSIS: ABNORMAL
GFR SERPL CREATININE-BSD FRML MDRD: >90 ML/MIN/{1.73_M2}
GLUCOSE SERPL-MCNC: 92 MG/DL (ref 70–99)
HDLC SERPL-MCNC: 58 MG/DL
HPV HR 12 DNA CVX QL NAA+PROBE: POSITIVE
HPV16 DNA SPEC QL NAA+PROBE: NEGATIVE
HPV18 DNA SPEC QL NAA+PROBE: NEGATIVE
LDLC SERPL CALC-MCNC: 102 MG/DL
NONHDLC SERPL-MCNC: 120 MG/DL
POTASSIUM SERPL-SCNC: 3.8 MMOL/L (ref 3.4–5.3)
PROT SERPL-MCNC: 7.6 G/DL (ref 6.8–8.8)
SODIUM SERPL-SCNC: 138 MMOL/L (ref 133–144)
SPECIMEN DESCRIPTION: ABNORMAL
SPECIMEN SOURCE CVX/VAG CYTO: ABNORMAL
TRIGL SERPL-MCNC: 88 MG/DL

## 2020-02-25 PROCEDURE — 80061 LIPID PANEL: CPT | Performed by: INTERNAL MEDICINE

## 2020-02-25 PROCEDURE — 87389 HIV-1 AG W/HIV-1&-2 AB AG IA: CPT | Performed by: INTERNAL MEDICINE

## 2020-02-25 PROCEDURE — 80053 COMPREHEN METABOLIC PANEL: CPT | Performed by: INTERNAL MEDICINE

## 2020-02-25 PROCEDURE — 36415 COLL VENOUS BLD VENIPUNCTURE: CPT | Performed by: INTERNAL MEDICINE

## 2020-02-25 NOTE — TELEPHONE ENCOUNTER
School Physical Exam form    Reason for Call:  Form, our goal is to have forms completed with 72 hours, however, some forms may require a visit or additional information.    Type of letter, form or note:  medical    Who is the form from?: Patient    Where did the form come from: Patient or family brought in       What clinic location was the form placed at?: Internal Medicine    Where the form was placed: Dr. Morataya's Box/Folder    What number is listed as a contact on the form?: [phone]       Additional comments: call when done for     Call taken on 2/25/2020 at 9:57 AM by Karyn Colon

## 2020-02-25 NOTE — TELEPHONE ENCOUNTER
The patient wants to be notified asap when the form is ready, she needs it for school.  And she wants to know her x-ray results asap too re her mantoux.

## 2020-02-26 LAB — HIV 1+2 AB+HIV1 P24 AG SERPL QL IA: NONREACTIVE

## 2020-02-26 NOTE — PROGRESS NOTES
2/19/20 NIL pap, + HR HPV (not 16/18). Plan 1 year cotest  02/26/20 Called pt - she was not available to talk. Results sent in [employer] per pt request - viewed by pt

## 2020-03-27 ENCOUNTER — VIRTUAL VISIT (OUTPATIENT)
Dept: FAMILY MEDICINE | Facility: OTHER | Age: 40
End: 2020-03-27

## 2020-03-27 NOTE — PROGRESS NOTES
"Date: 2020 07:57:58  Clinician: Kacy Mchugh  Clinician NPI: [id number]  Patient: Antonia De La Cruz  Patient : 1980  [address] [place] [address] [place] [address]  Patient Phone: [phone]  Visit Protocol: URI  Patient Summary:  Antonia is a 39 year old ( : 1980 ) female who initiated a Visit for COVID-19 (Coronavirus) evaluation and screening. When asked the question \"Please sign me up to receive news, health information and promotions. \", Antonia responded \"Yes\".    Antonia states her symptoms started gradually 10-13 days ago.   Her symptoms consist of a cough and myalgia.   Symptom details   Cough: Antonia coughs a few times an hour and her cough is not more bothersome at night. Phlegm does not come into her throat when she coughs. She does not believe her cough is caused by post-nasal drip.    Antonia denies having ear pain, rhinitis, wheezing, sore throat, nasal congestion, malaise, enlarged lymph nodes, teeth pain, headache, fever, facial pain or pressure, and chills. She also denies taking antibiotic medication for the symptoms, having recent facial or sinus surgery in the past 60 days, and double sickening (worsening symptoms after initial improvement). She is not experiencing dyspnea.   Precipitating events  She has not recently been exposed to someone with influenza. Antonia has been in close contact with the following high risk individuals: immunocompromised people, people with asthma, heart disease or diabetes, and adults 65 or older.   Pertinent COVID-19 (Coronavirus) information  Antonia has not traveled internationally or to the areas where COVID-19 (Coronavirus) is widespread, including cruise ship travel in the last 14 days before the start of her symptoms.   Antonia has not had a close contact with a laboratory-confirmed COVID-19 patient within 14 days of symptom onset. She also has not had a close contact with a suspected COVID-19 patient within 14 days of symptom " onset.   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   Pertinent medical history  Antonia does not get yeast infections when she takes antibiotics.   Antonia does not need a return to work/school note.   Weight: 120 lbs   Antonia does not smoke or use smokeless tobacco.   She denies pregnancy and denies breastfeeding. She has menstruated in the past month.   Weight: 120 lbs    MEDICATIONS: Tylenol Extra Strength oral, ALLERGIES: NKDA  Clinician Response:  Dear Antonia,   Based on the information you have provided, you do have symptoms that are consistent with Coronavirus (COVID-19).  The coronavirus causes mild to severe respiratory illness with the most common symptoms including fever, cough and difficulty breathing. Unfortunately, many viruses cause similar symptoms and it can be difficult to distinguish between viruses, especially in mild cases, so we are presuming that anyone with cough or fever has coronavirus at this time.  Coronavirus/COVID-19 has reached the point of community spread in [place], meaning that we are finding the virus in people with no known exposure risk for annika the virus. Given the increasing commonness of coronavirus in the community we are no longer testing patients who are not critically ill.  If you are a health care worker, you should refer to your employee health office for instructions about testing and returning to work.  For everyone else who has cough or fever, you should assume you are infected with coronavirus. Since you will not be tested but have symptoms that may be consistent with coronavirus, the CDC recommends you stay in self-isolation until these three things have happened:    You have had no fever for at least 72 hours (that is three full days of no fever without the use of medicine that reduces fevers)    AND   Other symptoms have improved  (for example, when your cough or shortness of breath have improved)   AND   At least 7 days have passed since your symptoms first appeared.   How to Isolate:   Isolate yourself at home.  Do Not allow any visitors  Do Not go to work or school  Do Not go to [employer],  centers, shopping, or other public places.  Do Not shake hands.  Avoid close contact with others (hugging, kissing).   Protect Others:   Cover Your Mouth and Nose with a mask, disposable tissue or wash cloth to avoid spreading germs to others.  Wash your hands and face frequently with soap and water.   We know it can be scary to hear that you might have COVID-19. Our team can help track your symptoms and make sure you are doing ok over the next two weeks using a program called [employer] to keep in touch. When you receive an email from [employer], please consider enrolling in our monitoring program. There is no cost to you for monitoring. Here is a URL where you can learn more: [email or web address]  Managing Symptoms:   At this time, we primarily recommend Tylenol (Acetaminophen) for fever or pain. If you have liver or kidney problems, contact your primary care provider for instructions on use of tylenol. Adults can take 650 mg (two 325 mg pills) by mouth every 4-6 hours as needed OR 1,000 mg (two 500 mg pills) every 8 hours as needed. MAXIMUM DAILY DOSE: 3,000mg. For children, refer to dosing on bottle based on age or weight.   If you develop significant shortness of breath that prevents you from doing normal activities, please call 911 or proceed to the nearest emergency room and alert them immediately that you have been in self-isolation for possible coronavirus.  If you have a higher risk medical condition such as cancer, heart failure, end stage renal disease on dialysis or have a transplant, please reach out to your specialist's clinic to advise them of your [facility] visit should you not improve within the next two days.   For  more information about COVID19 and options for caring for yourself at home, please visit the CDC website at https://www.cdc.gov/coronavirus/2019-ncov/about/steps-when-sick.htmlFor more options for care at [facility], please visit our website at [email or web address]    Diagnosis: Cough  Diagnosis ICD: R05  Additional Clinician Notes: There is an option to sign up for the Get Well Loop. these are providers who check in with you daily and can answer questions you may have and could treat you if needed. I would suggest accepting their services, which are free.

## 2020-03-27 NOTE — PROGRESS NOTES
"Date: 2020 08:23:14  Clinician: Shi Martinez  Clinician NPI: [id number]  Patient: Antonia De La Cruz  Patient : 1980  Patient Address: [address] [place] [address]  Patient Phone: [phone]  Visit Protocol: URI  Patient Summary:  Antonia is a 39 year old ( : 1980 ) female who initiated a Visit for COVID-19 (Coronavirus) evaluation and screening. When asked the question \"Please sign me up to receive news, health information and promotions. \", Antonia responded \"No\".    Antonia states her symptoms started gradually 10-13 days ago. After her symptoms started, they improved and then got worse again.   Her symptoms consist of a cough and myalgia.   Symptom details   Cough: Antonia coughs a few times an hour and her cough is not more bothersome at night. Phlegm does not come into her throat when she coughs. She does not believe her cough is caused by post-nasal drip.    Antonia denies having ear pain, rhinitis, wheezing, sore throat, nasal congestion, malaise, enlarged lymph nodes, teeth pain, headache, fever, facial pain or pressure, and chills. She also denies taking antibiotic medication for the symptoms and having recent facial or sinus surgery in the past 60 days. She is not experiencing dyspnea.   Precipitating events  She has not recently been exposed to someone with influenza. Antonia has been in close contact with the following high risk individuals: immunocompromised people, people with asthma, heart disease or diabetes, and adults 65 or older.   Pertinent COVID-19 (Coronavirus) information  Antonia has not traveled internationally or to the areas where COVID-19 (Coronavirus) is widespread, including cruise ship travel in the last 14 days before the start of her symptoms.   Antonia has not had a close contact with a laboratory-confirmed COVID-19 patient within 14 days of symptom onset. She also has not had a close contact with a suspected COVID-19 patient within 14 days of " symptom onset.   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 She provides direct patient care. She does not live with a healthcare worker.   Pertinent medical history  Antonia does not get yeast infections when she takes antibiotics.   Antonia does not need a return to work/school note.   Weight: 120 lbs   Antonia does not smoke or use smokeless tobacco.   She denies pregnancy and denies breastfeeding. She has menstruated in the past month.   Weight: 120 lbs    MEDICATIONS: No current medications, ALLERGIES: NKDA  Clinician Response:  Dear Antonia,   Based on the information you have provided, you do have symptoms that are consistent with Coronavirus (COVID-19).   The coronavirus causes mild to severe respiratory illness with the most common symptoms including fever, cough and difficulty breathing. Unfortunately, many viruses cause similar symptoms and it can be difficult to distinguish between viruses, especially in mild cases, so we are presuming that anyone with cough or fever has coronavirus at this time.  Coronavirus/COVID-19 has reached the point of community spread in [place], meaning that we are finding the virus in people with no known exposure risk for annika the virus. Given the increasing commonness of coronavirus in the community we are no longer testing patients who are not critically ill.  If you are a health care worker, you should refer to your employee health office for instructions about testing and returning to work.  For everyone else who has cough or fever, you should assume you are infected with coronavirus. Since you will not be tested but have symptoms that may be consistent with coronavirus, the CDC recommends you stay in self-isolation until these three things have happened:    You have had no fever for at least 72 hours (that is three full days of no fever without the use of medicine that reduces fevers)    AND   Other symptoms have  improved (for example, when your cough or shortness of breath have improved)   AND   At least 7 days have passed since your symptoms first appeared.   How to Isolate:    Isolate yourself at home.   Do Not allow any visitors  Do Not go to work or school  Do Not go to [employer],  centers, shopping, or other public places.  Do Not shake hands.  Avoid close contact with others (hugging, kissing).   Protect Others:    Cover Your Mouth and Nose with a mask, disposable tissue or wash cloth to avoid spreading germs to others.  Wash your hands and face frequently with soap and water.   Managing Symptoms:    At this time, we primarily recommend Tylenol (Acetaminophen) for fever or pain. If you have liver or kidney problems, contact your primary care provider for instructions on use of tylenol. Adults can take 650 mg (two 325 mg pills) by mouth every 4-6 hours as needed OR 1,000 mg (two 500 mg pills) every 8 hours as needed. MAXIMUM DAILY DOSE: 3,000mg. For children, refer to dosing on bottle based on age or weight.   If you develop significant shortness of breath that prevents you from doing normal activities, please call 911 or proceed to the nearest emergency room and alert them immediately that you have been in self-isolation for possible coronavirus.   If you have a higher risk medical condition such as cancer, heart failure, end stage renal disease on dialysis or have a transplant, please reach out to your specialist's clinic to advise them of your [facility] visit should you not improve within the next two days.  For more information about COVID19 and options for caring for yourself at home, please visit the CDC website at https://www.cdc.gov/coronavirus/2019-ncov/about/steps-when-sick.htmlFor more options for care at [facility], please visit our website at [email or web address]     Diagnosis: Cough  Diagnosis ICD: R05

## 2020-03-30 ENCOUNTER — TELEPHONE (OUTPATIENT)
Dept: FAMILY MEDICINE | Facility: OTHER | Age: 40
End: 2020-03-30

## 2020-03-30 DIAGNOSIS — R05.9 COUGH: Primary | ICD-10-CM

## 2020-03-30 RX ORDER — BENZONATATE 100 MG/1
100 CAPSULE ORAL 3 TIMES DAILY PRN
Qty: 21 CAPSULE | Refills: 0 | Status: SHIPPED | OUTPATIENT
Start: 2020-03-30 | End: 2020-03-30

## 2020-03-30 RX ORDER — BENZONATATE 100 MG/1
100 CAPSULE ORAL 3 TIMES DAILY PRN
Qty: 21 CAPSULE | Refills: 0 | Status: SHIPPED | OUTPATIENT
Start: 2020-03-30 | End: 2020-04-28

## 2020-03-30 RX ORDER — BENZONATATE 100 MG/1
100 CAPSULE ORAL 3 TIMES DAILY PRN
COMMUNITY
End: 2020-03-30

## 2020-03-30 NOTE — TELEPHONE ENCOUNTER
Discussed with Antonia in [facility] that she continues to have persistent cough and is out of robitussin. She went to the pharmacy and was unable to find more. Her cough is keeping her up at night and very bothersome and is not improving with hot tea, honey, cold medicines, or throat lozenges. Will send prescription for tessalon perles to her pharmacy. She is also having headaches and we discussed taking tylenol 1000mg q6h prn. She appreciated the recommendations.     Shae TERAN  [facility]/Get [facility] Loop

## 2020-04-26 ENCOUNTER — MYC MEDICAL ADVICE (OUTPATIENT)
Dept: INTERNAL MEDICINE | Facility: CLINIC | Age: 40
End: 2020-04-26

## 2020-04-27 NOTE — TELEPHONE ENCOUNTER
[employer] message sent to patient asking her to request an appointment with her provider.

## 2020-04-28 ENCOUNTER — VIRTUAL VISIT (OUTPATIENT)
Dept: INTERNAL MEDICINE | Facility: CLINIC | Age: 40
End: 2020-04-28
Payer: COMMERCIAL

## 2020-04-28 DIAGNOSIS — N89.8 VAGINAL DISCHARGE: ICD-10-CM

## 2020-04-28 DIAGNOSIS — R10.2 PELVIC PAIN IN FEMALE: Primary | ICD-10-CM

## 2020-04-28 DIAGNOSIS — R10.13 EPIGASTRIC PAIN: ICD-10-CM

## 2020-04-28 DIAGNOSIS — R30.0 DYSURIA: ICD-10-CM

## 2020-04-28 PROCEDURE — 99214 OFFICE O/P EST MOD 30 MIN: CPT | Mod: TEL | Performed by: INTERNAL MEDICINE

## 2020-04-28 RX ORDER — OMEPRAZOLE 40 MG/1
40 CAPSULE, DELAYED RELEASE ORAL DAILY
Qty: 90 CAPSULE | Refills: 0 | Status: SHIPPED | OUTPATIENT
Start: 2020-04-28 | End: 2023-11-22

## 2020-04-28 NOTE — PROGRESS NOTES
"Antonia De La Cruz is a 39 year old female who is being evaluated via a billable telephone visit.      The patient has been notified of following:     \"This telephone visit will be conducted via a call between you and your physician/provider. We have found that certain health care needs can be provided without the need for an in-person physical exam.  This service lets us provide the care you need with a telephone conversation.  If a prescription is necessary we can send it directly to your pharmacy.  If lab work is needed we can place an order for that and you can then stop by our lab to have the test done at a later time.    Telephone visits are billed at different rates depending on your insurance coverage.  Please reach out to your insurance provider with any questions.    If during the course of the call the physician/provider feels a telephone visit is not appropriate, you will not be charged for this service.\"    Patient has given verbal consent for telephone visit? Yes    How would you like to obtain your AVS? MyChart     TELEPHONE VISIT                                                      SUBJECTIVE:                                                      HPI: Antonia De La Cruz is a pleasant 39 year old female who requested a telephone visit to discuss multiple symptoms:    Most symptoms have been ongoing for the last several days to weeks. Symptoms include pelvic and epigastric pain. Patient describes her pelvic pain as \"being connected to her rectum.\" Patient describes her epigastric pain as worse after eating with associated bloating/gassiness. Symptoms also include dysuria, heavy vaginal discharge, and dyspareunia. Symptoms also include nausea, excessive fatigue, and subjective fevers (no subjective fevers today yet).    No vomiting.  No urinary frequency, urgency, or hematuria.  No vaginal irritation or itching.  No unusual vaginal color or odor.  No diarrhea, constipation, melena, or hematochezia.  No anorexia, " unintentional weight loss, or night sweats.  No cough, shortness of breath, or chest tightness.    No significant active medical problems.  Patient is in a long-term monogamous relationship with .  Patient's most recent Pap smear was performed 2 months ago: Normal cytology, positive HR HPV (not 16 or 18); repeat cotesting recommended in 1 year.    ASSESSMENT/PLAN:                                                      (R10.2) Pelvic pain in female  (primary encounter diagnosis)  (N89.8) Vaginal discharge  (R30.0) Dysuria  Comment: etiology unclear; may be related to dysuria and/or abnormal vaginal discharge.  Plan:    - patient will come in tomorrow for CBC, CMP, UA reflex, and GC/C and wet prep self swabs.   - recommendations to follow.    (R10.13) Epigastric pain  Comment: etiology unclear, but suspect dyspepsia contributing.  Plan:    - patient will come in tomorrow for CBC, CMP, and abdominal x-ray series.     - START omeprazole 40 mg daily and consistently.    Total time of call between patient and provider was 15 minutes.    (Chart documentation was completed, in part, with [facility] voice-recognition software. Even though reviewed, some grammatical, spelling, and word errors may remain.)    Nora Morataya MD   [address] [address]  T: [phone], F: [phone]

## 2020-04-29 ENCOUNTER — ANCILLARY PROCEDURE (OUTPATIENT)
Dept: GENERAL RADIOLOGY | Facility: CLINIC | Age: 40
End: 2020-04-29
Attending: INTERNAL MEDICINE
Payer: COMMERCIAL

## 2020-04-29 DIAGNOSIS — R10.2 PELVIC PAIN IN FEMALE: ICD-10-CM

## 2020-04-29 DIAGNOSIS — N89.8 VAGINAL DISCHARGE: ICD-10-CM

## 2020-04-29 DIAGNOSIS — R10.13 EPIGASTRIC PAIN: ICD-10-CM

## 2020-04-29 DIAGNOSIS — D64.9 ANEMIA, UNSPECIFIED TYPE: ICD-10-CM

## 2020-04-29 DIAGNOSIS — D64.9 ANEMIA, UNSPECIFIED TYPE: Primary | ICD-10-CM

## 2020-04-29 DIAGNOSIS — R30.0 DYSURIA: ICD-10-CM

## 2020-04-29 LAB
ALBUMIN SERPL-MCNC: 3.5 G/DL (ref 3.4–5)
ALBUMIN UR-MCNC: NEGATIVE MG/DL
ALP SERPL-CCNC: 105 U/L (ref 40–150)
ALT SERPL W P-5'-P-CCNC: 38 U/L (ref 0–50)
ANION GAP SERPL CALCULATED.3IONS-SCNC: 5 MMOL/L (ref 3–14)
APPEARANCE UR: CLEAR
AST SERPL W P-5'-P-CCNC: 35 U/L (ref 0–45)
BASOPHILS # BLD AUTO: 0 10E9/L (ref 0–0.2)
BASOPHILS NFR BLD AUTO: 0.2 %
BILIRUB SERPL-MCNC: 0.3 MG/DL (ref 0.2–1.3)
BILIRUB UR QL STRIP: NEGATIVE
BUN SERPL-MCNC: 17 MG/DL (ref 7–30)
CALCIUM SERPL-MCNC: 8.5 MG/DL (ref 8.5–10.1)
CHLORIDE SERPL-SCNC: 109 MMOL/L (ref 94–109)
CO2 SERPL-SCNC: 25 MMOL/L (ref 20–32)
COLOR UR AUTO: YELLOW
CREAT SERPL-MCNC: 0.58 MG/DL (ref 0.52–1.04)
DIFFERENTIAL METHOD BLD: ABNORMAL
EOSINOPHIL # BLD AUTO: 0.1 10E9/L (ref 0–0.7)
EOSINOPHIL NFR BLD AUTO: 0.7 %
ERYTHROCYTE [DISTWIDTH] IN BLOOD BY AUTOMATED COUNT: 14.7 % (ref 10–15)
FERRITIN SERPL-MCNC: 9 NG/ML (ref 12–150)
GFR SERPL CREATININE-BSD FRML MDRD: >90 ML/MIN/{1.73_M2}
GLUCOSE SERPL-MCNC: 86 MG/DL (ref 70–99)
GLUCOSE UR STRIP-MCNC: NEGATIVE MG/DL
HCT VFR BLD AUTO: 33.7 % (ref 35–47)
HGB BLD-MCNC: 10.8 G/DL (ref 11.7–15.7)
HGB UR QL STRIP: NEGATIVE
IRON SATN MFR SERPL: 12 % (ref 15–46)
IRON SERPL-MCNC: 47 UG/DL (ref 35–180)
KETONES UR STRIP-MCNC: NEGATIVE MG/DL
LEUKOCYTE ESTERASE UR QL STRIP: NEGATIVE
LYMPHOCYTES # BLD AUTO: 1.4 10E9/L (ref 0.8–5.3)
LYMPHOCYTES NFR BLD AUTO: 17.1 %
MCH RBC QN AUTO: 28.6 PG (ref 26.5–33)
MCHC RBC AUTO-ENTMCNC: 32 G/DL (ref 31.5–36.5)
MCV RBC AUTO: 89 FL (ref 78–100)
MONOCYTES # BLD AUTO: 0.8 10E9/L (ref 0–1.3)
MONOCYTES NFR BLD AUTO: 10.3 %
NEUTROPHILS # BLD AUTO: 5.8 10E9/L (ref 1.6–8.3)
NEUTROPHILS NFR BLD AUTO: 71.7 %
NITRATE UR QL: NEGATIVE
PH UR STRIP: 6 PH (ref 5–7)
PLATELET # BLD AUTO: 239 10E9/L (ref 150–450)
POTASSIUM SERPL-SCNC: 3.9 MMOL/L (ref 3.4–5.3)
PROT SERPL-MCNC: 7.8 G/DL (ref 6.8–8.8)
RBC # BLD AUTO: 3.77 10E12/L (ref 3.8–5.2)
SODIUM SERPL-SCNC: 139 MMOL/L (ref 133–144)
SOURCE: NORMAL
SP GR UR STRIP: >1.03 (ref 1–1.03)
SPECIMEN SOURCE: NORMAL
TIBC SERPL-MCNC: 384 UG/DL (ref 240–430)
UROBILINOGEN UR STRIP-ACNC: 0.2 EU/DL (ref 0.2–1)
WBC # BLD AUTO: 8.1 10E9/L (ref 4–11)
WET PREP SPEC: NORMAL

## 2020-04-29 PROCEDURE — 87491 CHLMYD TRACH DNA AMP PROBE: CPT | Performed by: INTERNAL MEDICINE

## 2020-04-29 PROCEDURE — 74019 RADEX ABDOMEN 2 VIEWS: CPT

## 2020-04-29 PROCEDURE — 87210 SMEAR WET MOUNT SALINE/INK: CPT | Performed by: INTERNAL MEDICINE

## 2020-04-29 PROCEDURE — 83540 ASSAY OF IRON: CPT | Performed by: INTERNAL MEDICINE

## 2020-04-29 PROCEDURE — 85025 COMPLETE CBC W/AUTO DIFF WBC: CPT | Performed by: INTERNAL MEDICINE

## 2020-04-29 PROCEDURE — 87591 N.GONORRHOEAE DNA AMP PROB: CPT | Performed by: INTERNAL MEDICINE

## 2020-04-29 PROCEDURE — 81003 URINALYSIS AUTO W/O SCOPE: CPT | Performed by: INTERNAL MEDICINE

## 2020-04-29 PROCEDURE — 36415 COLL VENOUS BLD VENIPUNCTURE: CPT | Performed by: INTERNAL MEDICINE

## 2020-04-29 PROCEDURE — 80053 COMPREHEN METABOLIC PANEL: CPT | Performed by: INTERNAL MEDICINE

## 2020-04-29 PROCEDURE — 82728 ASSAY OF FERRITIN: CPT | Performed by: INTERNAL MEDICINE

## 2020-04-29 PROCEDURE — 83550 IRON BINDING TEST: CPT | Performed by: INTERNAL MEDICINE

## 2020-04-30 LAB
C TRACH DNA SPEC QL NAA+PROBE: NEGATIVE
N GONORRHOEA DNA SPEC QL NAA+PROBE: NEGATIVE
SPECIMEN SOURCE: NORMAL
SPECIMEN SOURCE: NORMAL

## 2020-09-15 ENCOUNTER — DOCUMENTATION ONLY (OUTPATIENT)
Dept: INTERNAL MEDICINE | Facility: CLINIC | Age: 40
End: 2020-09-15

## 2020-09-15 DIAGNOSIS — D50.0 IRON DEFICIENCY ANEMIA DUE TO CHRONIC BLOOD LOSS: Primary | ICD-10-CM

## 2020-09-16 ENCOUNTER — TELEPHONE (OUTPATIENT)
Dept: INTERNAL MEDICINE | Facility: CLINIC | Age: 40
End: 2020-09-16

## 2020-09-16 DIAGNOSIS — Z11.1 SCREENING EXAMINATION FOR PULMONARY TUBERCULOSIS: Primary | ICD-10-CM

## 2020-09-16 NOTE — TELEPHONE ENCOUNTER
Spoke with patient and informed that order was placed. Patient gave verbal understanding. I also scheduled patient for Xray for Friday.

## 2020-09-16 NOTE — TELEPHONE ENCOUNTER
Reason for Call: Request for an order or referral:    Order or referral being requested: CXR for school (instead of mantoux test)    Date needed: as soon as possible    Has the patient been seen by the PCP for this problem? NO    Additional comments: Pt tests positive to mantoux tests due [place]. Please call pt when the CXR has been ordered.    Phone number Patient can be reached at:  Home number on file [phone] (home)    Best Time:  anytime    Can we leave a detailed message on this number?  YES    Call taken on 9/16/2020 at 9:09 AM by SATHYA ZIEGLER

## 2020-09-16 NOTE — TELEPHONE ENCOUNTER
Dermatology:  Dr. Yuan--[place], [place]  [address] [facility]   Office: [phone]      Mammogram due Nov 2020  Please schedule mammogram, you may call their contact number at [phone]. They are located at [address] [place] [address], [place] [address]    Labs today while you are here    Continue current medications    Formula 33 on the toenail.                                                                  Medicare Wellness Visit  Plan for Preventive Care    A good way for you to stay healthy is to use preventive care.  Medicare covers many services that can help you stay healthy.* The goal of these services is to find any health problems as quickly as possible. Finding problems early can help make them easier to treat.  Your personal plan below lists the services you may need and when they are due.     Health Maintenance Summary     Shingles Vaccine (2 of 3)  Overdue since 7/3/2012    Medicare Wellness 65+ (Yearly)  Overdue since 3/13/2020    Depression Screening (Yearly)  Overdue since 3/21/2020    Breast Cancer Screening (Every 2 Years)  Next due on 11/11/2021    DTaP/Tdap/Td Vaccine (2 - Td)  Next due on 5/25/2023    Colorectal Cancer Screening-Colonoscopy (Every 10 Years)  Next due on 12/16/2026    Pneumococcal Vaccine 65+   Completed    Osteoporosis Screening   Completed    Hepatitis C Screening   Completed    Influenza Vaccine   Completed    Hepatitis B Vaccine   Aged Out    Meningococcal Vaccine   Aged Out           Preventive Care for Women and Men    Heart Screenings (Cardiovascular):  · Blood tests are used to check your cholesterol, lipid and triglyceride levels. High levels can increase your risk for heart disease and stroke. High levels can be treated with medications, diet and exercise. Lowering your levels can help keep your heart and blood vessels healthy.  Your provider will order these tests if they are needed.    · An ultrasound is done to see if you have an abdominal aortic aneurysm (AAA).  Please advise    This is an enlargement of one of the main blood vessels that delivers blood to the body.   In the [place], 9,000 deaths are caused by AAA.  You may not even know you have this problem and as many as 1 in 3 people will have a serious problem if it is not treated.  Early diagnosis allows for more effective treatment and cure.  If you have a family history of AAA or are a male age 65-75 who has smoked, you are at higher risk of an AAA.  Your provider can order this test, if needed.    Colorectal Screening:  · There are many tests that are used to check for cancer of your colon and rectum. You and your provider should discuss what test is best for you and when to have it done.  Options include:  · Screening Colonoscopy: exam of the entire colon, seen through a flexible lighted tube.  · Flexible Sigmoidoscopy: exam of the last third (sigmoid portion) of the colon and rectum, seen through a flexible lighted tube.  · Cologuard DNA stool test: a sample of your stool is used to screen for cancer and unseen blood in your stool.  · Fecal Occult Blood Test: a sample of your stool is studied to find any unseen blood    Flu Shot:  · An immunization that helps to prevent influenza (the flu). You should get this every year. The best time to get the shot is in the fall.    Pneumococcal Shot:  • Vaccines are available that can help prevent pneumococcal disease, which is any type of infection caused by Streptococcus pneumoniae bacteria.   Their use can prevent some cases of pneumonia, meningitis, and sepsis. There are two types of pneumococcal vaccines:   o Conjugate vaccines (PCV-13 or Prevnar 13®) - helps protect against the 13 types of pneumococcal bacteria that are the most common causes of serious infections in children and adults.    o Polysaccharide vaccine (PPSV23 or Einqgvohs37®) - helps protect against 23 types of pneumococcal bacteria for patients who are recommended to get it.  These vaccines should be given at  least 12 months apart.  A booster is usually not needed.     Hepatitis B Shot:  · An immunization that helps to protect people from getting Hepatitis B. Hepatitis B is a virus that spreads through contact with infected blood or body fluids. Many people with the virus do not have symptoms.  The virus can lead to serious problems, such as liver disease. Some people are at higher risk than others. Your doctor will tell you if you need this shot.     Diabetes Screening:  · A test to measure sugar (glucose) in your blood is called a fasting blood sugar. Fasting means you cannot have food or drink for at least 8 hours before the test. This test can detect diabetes long before you may notice symptoms.    Glaucoma Screening:  · Glaucoma screening is performed by your eye doctor. The test measures the fluid pressure inside your eyes to determine if you have glaucoma.     Hepatitis C Screening:  · A blood test to see if you have the hepatitis C virus.  Hepatitis C attacks the liver and is a major cause of chronic liver disease.  Medicare will cover a single screening for all adults born between 1945 & 1965, or high risk patients (people who have injected illegal drugs or people who have had blood transfusions).  High risk patients who continue to inject illegal drugs can be screened for Hepatitis C every year.    Smoking and Tobacco-Use Cessation Counseling:  · Tobacco is the single greatest cause of disease and early death in our country today. Medication and counseling together can increase a person’s chance of quitting for good.   · Medicare covers two quitting attempts per year, with four counseling sessions per attempt (eight sessions in a 12 month period)    Preventive Screening tests for Women    Screening Mammograms and Breast Exams:  · An x-ray of your breasts to check for breast cancer before you or your doctor may be able to feel it.  If breast cancer is found early it can usually be treated with success.    Pelvic  Exams and Pap Tests:  · An exam to check for cervical and vaginal cancer. A Pap test is a lab test in which cells are taken from your cervix and sent to the lab to look for signs of cervical cancer. If cancer of the cervix is found early, chances for a cure are good. Testing can generally end at age 65, or if a woman has a hysterectomy for a benign condition. Your provider may recommend more frequent testing if certain abnormal results are found.    Bone Mass Measurements:  · A painless x-ray of your bone density to see if you are at risk for a broken bone. Bone density refers to the thickness of bones or how tightly the bone tissue is packed.    Preventive Screening tests for Men    Prostate Screening:  · Should you have a prostate cancer test (PSA)?  It is up to you to decide if you want a prostate cancer test. Talk to your clinician to find out if the test is right for you.  Things for you to consider and talk about should include:  · Benefits and harms of the test  · Your family history  · How your race/ethnicity may influence the test  · If the test may impact other medical conditions you have  · Your values on screenings and treatments    *Medicare pays for many preventive services to keep you healthy. For some of these services, you might have to pay a deductible, coinsurance, and / or copayment.  The amounts vary depending on the type of services you need and the kind of Medicare health plan you have.

## 2020-09-18 DIAGNOSIS — Z11.1 SCREENING EXAMINATION FOR PULMONARY TUBERCULOSIS: ICD-10-CM

## 2020-09-18 PROCEDURE — 71046 X-RAY EXAM CHEST 2 VIEWS: CPT

## 2020-09-21 ENCOUNTER — OFFICE VISIT (OUTPATIENT)
Dept: URGENT CARE | Facility: URGENT CARE | Age: 40
End: 2020-09-21
Payer: COMMERCIAL

## 2020-09-21 VITALS
RESPIRATION RATE: 20 BRPM | OXYGEN SATURATION: 98 % | BODY MASS INDEX: 24.45 KG/M2 | TEMPERATURE: 98.5 F | SYSTOLIC BLOOD PRESSURE: 129 MMHG | HEART RATE: 67 BPM | DIASTOLIC BLOOD PRESSURE: 91 MMHG | WEIGHT: 119 LBS

## 2020-09-21 DIAGNOSIS — H60.392 INFECTIVE OTITIS EXTERNA, LEFT: ICD-10-CM

## 2020-09-21 DIAGNOSIS — H61.22 IMPACTED CERUMEN OF LEFT EAR: ICD-10-CM

## 2020-09-21 DIAGNOSIS — H92.02 OTALGIA, LEFT: Primary | ICD-10-CM

## 2020-09-21 PROCEDURE — 99213 OFFICE O/P EST LOW 20 MIN: CPT | Mod: 25 | Performed by: FAMILY MEDICINE

## 2020-09-21 PROCEDURE — 69209 REMOVE IMPACTED EAR WAX UNI: CPT | Mod: LT | Performed by: FAMILY MEDICINE

## 2020-09-21 RX ORDER — NEOMYCIN SULFATE, POLYMYXIN B SULFATE AND HYDROCORTISONE 10; 3.5; 1 MG/ML; MG/ML; [USP'U]/ML
3 SUSPENSION/ DROPS AURICULAR (OTIC) 4 TIMES DAILY
Qty: 6 ML | Refills: 0 | Status: SHIPPED | OUTPATIENT
Start: 2020-09-21 | End: 2020-10-01

## 2020-09-21 NOTE — PROGRESS NOTES
SUBJECTIVE:Antonia De La Cruz is a 40 year old female who presents with left ear pain and waxfor day(s).   Severity: mild and moderate   Timing:still present  Additional symptoms include none.    History of recurrent otitis: no    Past Medical History:   Diagnosis Date     Cervical high risk HPV (human papillomavirus) test positive 2/19/2020    See problem list     History of positive PPD     treated with INH in 2019     NO ACTIVE PROBLEMS      No Known Allergies  Social History     Tobacco Use     Smoking status: Never Smoker     Smokeless tobacco: Never Used   Substance Use Topics     Alcohol use: Not Currently       ROS: CONSTITUTIONAL:NEGATIVE for fever, chills, change in weight    OBJECTIVE:  BP (!) 129/91   Pulse 67   Temp 98.5  F (36.9  C) (Temporal)   Resp 20   Wt 54 kg (119 lb)   LMP  (LMP Unknown)   SpO2 98%   BMI 24.45 kg/m     The right TM is normal: no effusions, no erythema, and normal landmarks     The right auditory canal is normal and without drainage, edema or erythema  The left TM is not visualized secondary to cerumen  The left auditory canal is erythematous, swollen, tender  Oropharynx exam is normal: no lesions, erythema, adenopathy or exudate.GENERAL: no acute distress  EYES: EOMI,  PERRL, conjunctiva clear  SKIN: no suspicious lesions or rashes       ICD-10-CM    1. Otalgia, left  H92.02    2. Impacted cerumen of left ear  H61.22 HC REMOVAL IMPACTED CERUMEN IRRIGATION/LVG UNILAT     Nursing Communication 1   3. Infective otitis externa, left  H60.392 neomycin-polymyxin-hydrocortisone (CORTISPORIN) [id number] otic suspension       F/U PCP/IM/FP/UC if worse, not any better

## 2020-10-02 ENCOUNTER — VIRTUAL VISIT (OUTPATIENT)
Dept: FAMILY MEDICINE | Facility: OTHER | Age: 40
End: 2020-10-02

## 2020-10-02 NOTE — PROGRESS NOTES
"Date: 10/02/2020 14:46:49  Clinician: Chayo Ogden  Clinician NPI: [id number]  Patient: Antonia De La Cruz  Patient : 1980  [place] [address] [place] [address]  Patient Phone: [phone]  Visit Protocol: URI  Patient Summary:  Antonia is a 40 year old ( : 1980 ) female who initiated a [facility] Visit for COVID-19 (Coronavirus) evaluation and screening. When asked the question \"Please sign me up to receive news, health information and promotions. \", Antonia responded \"No\".    Antonia states her symptoms started today.   Her symptoms consist of myalgia, chills, and malaise.   Antonia denies having ear pain, headache, wheezing, fever, cough, nasal congestion, anosmia, vomiting, rhinitis, nausea, facial pain or pressure, sore throat, teeth pain, ageusia, and diarrhea. She also denies taking antibiotic medication in the past month and having recent facial or sinus surgery in the past 60 days. She is not experiencing dyspnea.   Precipitating events  She has not recently been exposed to someone with influenza. Antonia has been in close contact with the following high risk individuals: adults 65 or older.   Pertinent COVID-19 (Coronavirus) information  In the past 14 days, Antonia has not worked in a congregate living setting.   She either works or volunteers as a healthcare worker or a , or works or volunteers in a healthcare facility. 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 She lives with a healthcare worker.   Antonia has not had a close contact with a laboratory-confirmed COVID-19 patient within 14 days of symptom onset.   Since 2019, Antonia and has not had upper respiratory infection or influenza-like illness. Has not been diagnosed with lab-confirmed COVID-19 test   Pertinent medical " history  Antonia does not get yeast infections when she takes antibiotics.   Antonia needs a return to work/school note.   Weight: 126 lbs   Antonia does not smoke or use smokeless tobacco.   She denies pregnancy and denies breastfeeding. She is currently menstruating.   Weight: 126 lbs    MEDICATIONS: No current medications, ALLERGIES: NKDA  Clinician Response:  Dear Antonia,   Your symptoms show that you may have coronavirus (COVID-19). This illness can cause fever, cough and trouble breathing. Many people get a mild case and get better on their own. Some people can get very sick.  Based on the symptoms you have shared, I would like you to be re-checked in 2 to 3 days. Please call your family clinic to set up a video or phone visit.  Will I be tested for COVID-19?  We would like to test you for this virus.   Please call [phone] to schedule your visit. Explain that you were referred by [facility] to have a COVID-19 test. Be ready to share [facility] number.   The following will serve as your written order for this COVID Test, ordered by me, for the indication of suspected COVID [Z20.828]: The test will be ordered in [facility], our electronic health record, after you are scheduled. It will show as ordered and authorized by Valentin Dey MD.  Order: COVID-19 (Coronavirus) PCR for SYMPTOMATIC testing from [facility].  1.When it's time for your COVID test:   Stay at least 6 feet away from others. (If someone will drive you to your test, stay in the backseat, as far away from the  as you can.)   Cover your mouth and nose with a mask, tissue or washcloth.  Go straight to the testing site. Don't make any stops on the way there or back.      2.Starting now: Stay home and away from others (self-isolate) until:   You've had no fever---and no medicine that reduces fever---for one full day (24 hours). And...   Your other symptoms have gotten better. For example, your cough or breathing has improved. And...   At least 10  "days have passed since your symptoms started.       During this time, don't leave the house except for testing or medical care.   Stay in your own room, even for meals. Use your own bathroom if you can.   Stay away from others in your home. No hugging, kissing or shaking hands. No visitors.  Don't go to work, school or anywhere else.    Clean \"high touch\" surfaces often (doorknobs, counters, handles, etc.). Use a household cleaning spray or wipes. You'll find a full list of  on the EPA website: www.epa.gov/pesticide-registration/list-n-disinfectants-use-against-sars-cov-2.   Cover your mouth and nose with a mask, tissue or washcloth to avoid spreading germs.  Wash your hands and face often. Use soap and water.  Caregivers in these groups are at risk for severe illness due to COVID-19:  o People 65 years and older  o People who live in a nursing home or long-term care facility  o People with chronic disease (lung, heart, cancer, diabetes, kidney, liver, immunologic)   o People who have a weakened immune system, including those who:   Are in cancer treatment  Take medicine that weakens the immune system, such as corticosteroids  Had a bone marrow or organ transplant  Have an immune deficiency  Have poorly controlled HIV or AIDS  Are obese (body mass index of 40 or higher)  Smoke regularly   o Caregivers should wear gloves while washing dishes, handling laundry and cleaning bedrooms and bathrooms.  o Use caution when washing and drying laundry: Don't shake dirty laundry, and use the warmest water setting that you can.  o For more tips, go to www.cdc.gov/coronavirus/2019-ncov/downloads/10Things.pdf.      How can I take care of myself?   Get lots of rest. Drink extra fluids (unless a doctor has told you not to)   Take Tylenol (acetaminophen) for fever or pain. If you have liver or kidney problems, ask your family doctor if it's okay to take Tylenol.   Adults can take either:    650 mg (two 325 mg pills) every 4 " to 6 hours, or...   1,000 mg (two 500 mg pills) every 8 hours as needed.    Note: Don't take more than 3,000 mg in one day. Acetaminophen is found in many medicines (both prescribed and over-the-counter medicines). Read all labels to be sure you don't take too much.   For children, check the Tylenol bottle for the right dose. The dose is based on the child's age or weight.    If you have other health problems (like cancer, heart failure, an organ transplant or severe kidney disease): Call your specialty clinic if you don't feel better in the next 2 days.       Know when to call 911. Emergency warning signs include:    Trouble breathing or shortness of breath Pain or pressure in the chest that doesn't go away Feeling confused like you haven't felt before, or not being able to wake up Bluish-colored lips or face  Where can I get more information?   [facility] -- About COVID-19: [email or web address]   CDC -- What to Do If You're Sick: www.cdc.gov/coronavirus/2019-ncov/about/steps-when-sick.html   CDC -- Ending Home Isolation: www.cdc.gov/coronavirus/2019-ncov/hcp/disposition-in-home-patients.html   CDC -- Caring for Someone: www.cdc.gov/coronavirus/2019-ncov/if-you-are-sick/care-for-someone.html   [facility] -- Interim Guidance for Hospital Discharge to Home: www.health.[facility].[place]/diseases/coronavirus/hcp/hospdischarge.pdf   [facility] clinical trials (COVID-19 research studies): clinicalaffairs.[facility]/umn-clinical-trials    Below are the COVID-19 hotlines at the [place] Department of Health ([facility]). Interpreters are available.    For health questions: Call 283-545-4666 or 1-996.944.7510 (7 a.m. to 7 p.m.) For questions about schools and childcare: Call 267-982-5720 or 1-112.554.9630 (7 a.m. to 7 p.m.)       Diagnosis: Myalgia, unspecified site  Diagnosis ICD: M79.10

## 2020-11-21 ENCOUNTER — OFFICE VISIT (OUTPATIENT)
Dept: URGENT CARE | Facility: URGENT CARE | Age: 40
End: 2020-11-21
Payer: COMMERCIAL

## 2020-11-21 ENCOUNTER — NURSE TRIAGE (OUTPATIENT)
Dept: NURSING | Facility: CLINIC | Age: 40
End: 2020-11-21

## 2020-11-21 VITALS
HEART RATE: 63 BPM | OXYGEN SATURATION: 100 % | DIASTOLIC BLOOD PRESSURE: 86 MMHG | SYSTOLIC BLOOD PRESSURE: 131 MMHG | RESPIRATION RATE: 14 BRPM | TEMPERATURE: 96.6 F

## 2020-11-21 DIAGNOSIS — R07.9 CHEST PAIN, UNSPECIFIED TYPE: Primary | ICD-10-CM

## 2020-11-21 DIAGNOSIS — F41.9 ANXIETY: ICD-10-CM

## 2020-11-21 PROCEDURE — 99213 OFFICE O/P EST LOW 20 MIN: CPT | Performed by: PHYSICIAN ASSISTANT

## 2020-11-21 PROCEDURE — 93000 ELECTROCARDIOGRAM COMPLETE: CPT | Performed by: PHYSICIAN ASSISTANT

## 2020-11-21 RX ORDER — HYDROXYZINE PAMOATE 50 MG/1
50 CAPSULE ORAL 3 TIMES DAILY PRN
Qty: 60 CAPSULE | Refills: 0 | Status: SHIPPED | OUTPATIENT
Start: 2020-11-21 | End: 2023-11-24

## 2020-11-21 RX ORDER — HYDROXYZINE PAMOATE 50 MG/1
50 CAPSULE ORAL 3 TIMES DAILY PRN
Qty: 60 CAPSULE | Refills: 0 | Status: SHIPPED | OUTPATIENT
Start: 2020-11-21 | End: 2020-11-21

## 2020-11-21 ASSESSMENT — ENCOUNTER SYMPTOMS
AGITATION: 0
CONFUSION: 0
CONSTITUTIONAL NEGATIVE: 1
LIGHT-HEADEDNESS: 0
HEADACHES: 0
SEIZURES: 0
NUMBNESS: 0
DIZZINESS: 0
DYSPHORIC MOOD: 0
SPEECH DIFFICULTY: 0
WHEEZING: 0
HYPERACTIVE: 0
PALPITATIONS: 1
COUGH: 0
WEAKNESS: 0
APNEA: 0
HALLUCINATIONS: 0
TREMORS: 1
GASTROINTESTINAL NEGATIVE: 1
NERVOUS/ANXIOUS: 1
DECREASED CONCENTRATION: 0
CHEST TIGHTNESS: 1
EYES NEGATIVE: 1
SHORTNESS OF BREATH: 0
SLEEP DISTURBANCE: 1
FACIAL ASYMMETRY: 0
MUSCULOSKELETAL NEGATIVE: 1
CHOKING: 0
STRIDOR: 0

## 2020-11-21 NOTE — PROGRESS NOTES
SUBJECTIVE:   Antonia De La Cruz is a 40 year old female presenting with a chief complaint of left hand tremor and numbness and chest pain.    She is an established patient of [facility].    Symptom Onset: intermittently over the last several weeks.  Timing of illness: sudden onset and intermittent episodes lasting  2  minutes.  Current and Associated symptoms: chest pain and tremor in hands.  Character: pressure and tightness.  Severity mild.  Location: right chest.  Radiation: upper back.  Associated Symptoms: stress/anxiety.  Exacerbated by: nothing.  Relieved by: deep breathing.  Cardiac risk factors: none.      Review of Systems   Constitutional: Negative.    HENT: Negative.    Eyes: Negative.    Respiratory: Positive for chest tightness. Negative for apnea, cough, choking, shortness of breath, wheezing and stridor.    Cardiovascular: Positive for palpitations. Negative for chest pain and leg swelling.   Gastrointestinal: Negative.    Genitourinary: Negative.    Musculoskeletal: Negative.    Neurological: Positive for tremors. Negative for dizziness, seizures, syncope, facial asymmetry, speech difficulty, weakness, light-headedness, numbness and headaches.   Psychiatric/Behavioral: Positive for sleep disturbance. Negative for agitation, behavioral problems, confusion, decreased concentration, dysphoric mood, hallucinations, self-injury and suicidal ideas. The patient is nervous/anxious. The patient is not hyperactive.        Past Medical History:   Diagnosis Date     Cervical high risk HPV (human papillomavirus) test positive 2/19/2020    See problem list     History of positive PPD     treated with INH in 2019     NO ACTIVE PROBLEMS      Family History   Problem Relation Age of Onset     Diabetes Type 2  Father      Hypertension Father      Hyperlipidemia Father      Myocardial Infarction No family hx of      Cerebrovascular Disease No family hx of      Coronary Artery Disease Early Onset No family hx of       Breast Cancer No family hx of      Ovarian Cancer No family hx of      Colon Cancer No family hx of      Current Outpatient Medications   Medication Sig Dispense Refill     ferrous sulfate (SLO-FE) 142 (45 Fe) MG CR tablet Take 1 tablet (142 mg) by mouth daily (Patient not taking: Reported on 9/21/2020) 90 tablet 3     omeprazole (PRILOSEC) 40 MG DR capsule Take 1 capsule (40 mg) by mouth daily (Patient not taking: Reported on 11/21/2020) 90 capsule 0     Social History     Tobacco Use     Smoking status: Never Smoker     Smokeless tobacco: Never Used   Substance Use Topics     Alcohol use: Not Currently       OBJECTIVE  /86   Pulse 63   Temp 96.6  F (35.9  C) (Temporal)   Resp 14   SpO2 100%     Physical Exam  Constitutional:       General: She is not in acute distress.     Appearance: Normal appearance. She is normal weight. She is not ill-appearing, toxic-appearing or diaphoretic.   HENT:      Head: Normocephalic and atraumatic.   Neck:      Musculoskeletal: Normal range of motion and neck supple. No neck rigidity or muscular tenderness.      Vascular: No carotid bruit.   Cardiovascular:      Rate and Rhythm: Normal rate and regular rhythm.      Pulses: Normal pulses.      Heart sounds: Normal heart sounds. No murmur. No friction rub. No gallop.    Pulmonary:      Effort: Pulmonary effort is normal. No respiratory distress.      Breath sounds: Normal breath sounds. No stridor. No wheezing, rhonchi or rales.   Chest:      Chest wall: No tenderness.   Abdominal:      General: Abdomen is flat. Bowel sounds are normal. There is no distension.      Palpations: Abdomen is soft.      Tenderness: There is no abdominal tenderness. There is no right CVA tenderness, left CVA tenderness, guarding or rebound.      Hernia: No hernia is present.   Lymphadenopathy:      Cervical: No cervical adenopathy.   Neurological:      General: No focal deficit present.      Mental Status: She is alert and oriented to person,  place, and time. Mental status is at baseline.      Gait: Gait normal.   Psychiatric:         Mood and Affect: Mood normal.         Behavior: Behavior normal.         Thought Content: Thought content normal.         Judgment: Judgment normal.              EKG Interpretation:      Interpreted by Dillon Dorantes PA-C  Symptoms at time of EKG: None   Rhythm: Normal sinus   Rate: Normal  Axis: Normal  Ectopy: None  Conduction: Normal  ST Segments/ T Waves: No ST-T wave changes and No acute ischemic changes  Q Waves: None  Comparison to prior: No old EKG available    Clinical Impression: normal EKG      ASSESSMENT/PLAN:    (R07.9) Chest pain, unspecified type  (primary encounter diagnosis)  Plan: EKG 12-lead complete w/read - Clinics,         hydrOXYzine (VISTARIL) 50 MG capsule    (F41.9) Anxiety  Plan: hydrOXYzine (VISTARIL) 50 MG capsule,          Reassurance given. Patient offered psych referral but she refused as she is leaving the country [place].     Dillon Dorantes PA-C on 11/21/2020 at 7:08 PM

## 2020-11-21 NOTE — TELEPHONE ENCOUNTER
"Patient calling reporting intermittent \"tremors in my hand\" Starting yesterday. Patient reporting increased anxiety due to recent exams. Stating she has had chest pain with movement/position change. Denies pain during triage. Reporting twitching occurring intermittent in palm of left hand.  Reporting pain is brief mid chest lasting less then a minute. Pain is brought on \"when getting up\"/ position change.   Stating she had back pain yesterday 11/20/20. Reporting after exam yesterday she felt symptoms of headache. Patient is attributing symptoms to possible anxiety. Denies shortness of breath.     COVID 19 Nurse Triage Plan/Patient Instructions    Please be aware that novel coronavirus (COVID-19) may be circulating in the community. If you develop symptoms such as fever, cough, or SOB or if you have concerns about the presence of another infection including coronavirus (COVID-19), please contact your health care provider or visit [email or web address].     Disposition/Instructions    In-Person Visit with provider recommended. Reference Visit Selection Guide.    Thank you for taking steps to prevent the spread of this virus.  o Limit your contact with others.  o Wear a simple mask to cover your cough.  o Wash your hands well and often.    Resources    [facility]: About COVID-19: [email or web address]    CDC: What to Do If You're Sick: www.cdc.gov/coronavirus/2019-ncov/about/steps-when-sick.html    CDC: Ending Home Isolation: www.cdc.gov/coronavirus/2019-ncov/hcp/disposition-in-home-patients.html     CDC: Caring for Someone: www.cdc.gov/coronavirus/2019-ncov/if-you-are-sick/care-for-someone.html     [facility]: Interim Guidance for Hospital Discharge to Home: www.health.[facility].[place].us/diseases/coronavirus/hcp/hospdischarge.pdf    [facility] clinical trials (COVID-19 research studies): [email or web address].[email or web address]-clinical-trials     Below are the COVID-19 hotlines at the [place] [facility] of [facility] " "([facility]). Interpreters are available.   o For health questions: Call 340-747-1206 or 1-351.454.1027 (7 a.m. to 7 p.m.)  o For questions about schools and childcare: Call 215-789-4057 or 1-290.544.2331 (7 a.m. to 7 p.m.)                         Additional Information    Negative: Severe difficulty breathing (e.g., struggling for each breath, speaks in single words)    Negative: Difficult to awaken or acting confused (e.g., disoriented, slurred speech)    Negative: Shock suspected (e.g., cold/pale/clammy skin, too weak to stand, low BP, rapid pulse)    Negative: [1] Chest pain lasts > 5 minutes AND [2] history of heart disease  (i.e., heart attack, bypass surgery, angina, angioplasty, CHF; not just a heart murmur)    Negative: [1] Chest pain lasts > 5 minutes AND [2] described as crushing, pressure-like, or heavy    Negative: [1] Chest pain lasts > 5 minutes AND [2] age > 50    Negative: [1] Chest pain lasts > 5 minutes AND [2] age > 30 AND [3] at least one cardiac risk factor (i.e., hypertension, diabetes, obesity, smoker or strong family history of heart disease)    Negative: [1] Chest pain lasts > 5 minutes AND [2] not relieved with nitroglycerin    Negative: Passed out (i.e., lost consciousness, collapsed and was not responding)    Negative: Heart beating < 50 beats per minute OR > 140 beats per minute    Negative: Visible sweat on face or sweat dripping down face    Negative: Sounds like a life-threatening emergency to the triager    Negative: Followed a chest injury    Negative: SEVERE chest pain    Negative: [1] Intermittent  chest pain or \"angina\" AND [2] increasing in severity or frequency  (Exception: pains lasting a few seconds)    Negative: Pain also present in shoulder(s) or arm(s) or jaw  (Exception: pain is clearly made worse by movement)    Negative: Difficulty breathing    Negative: Dizziness or lightheadedness    Negative: Coughing up blood    Negative: Cocaine use within last 3 days    Negative: " "History of prior \"blood clot\" in leg or lungs (i.e., deep vein thrombosis, pulmonary embolism)    Negative: Recent illness requiring prolonged bedrest (i.e., immobilization)    Negative: Hip or leg fracture in past 2 months (e.g., had cast on leg or ankle)    Negative: Major surgery in the past month    Negative: Recent long-distance travel with prolonged time in car, bus, plane, or train (i.e., within past 2 weeks; 6 or  more hours duration)    Negative: Chest pain lasts > 5 minutes (Exceptions: chest pain occurring > 3 days ago and now asymptomatic; same as previously diagnosed heartburn and has accompanying sour taste in mouth)    Negative: Taking a deep breath makes pain worse    Negative: Patient sounds very sick or weak to the triager    Negative: [1] Chest pain lasts > 5 minutes AND [2] occurred > 3 days ago (72 hours) AND [3] NO chest pain or cardiac symptoms now    [1] Chest pain lasting <= 5 minutes AND [2] NO chest pain or cardiac symptoms now(Exceptions: pains lasting a few seconds)    Protocols used: CHEST PAIN-A-AH      "

## 2020-11-22 ENCOUNTER — E-VISIT (OUTPATIENT)
Dept: URGENT CARE | Facility: URGENT CARE | Age: 40
End: 2020-11-22
Payer: COMMERCIAL

## 2020-11-22 DIAGNOSIS — Z20.822 SUSPECTED COVID-19 VIRUS INFECTION: Primary | ICD-10-CM

## 2020-11-22 PROCEDURE — 99421 OL DIG E/M SVC 5-10 MIN: CPT | Performed by: FAMILY MEDICINE

## 2020-11-22 NOTE — PATIENT INSTRUCTIONS
Patient Education     Treating Anxiety Disorders with Therapy    If you have an anxiety disorder, you don t have to suffer anymore. Treatment is available. Therapy (also called counseling) is often a helpful treatment for anxiety disorders. With therapy, a specially trained professional (therapist) helps you face and learn to manage your anxiety. Therapy can be short-term or long-term depending on your needs. In some cases, medicine may also be prescribed with therapy. It may take time before you notice how much therapy is helping, but stick with it. With therapy, you can feel better.  Cognitive behavioral therapy (CBT)  Cognitive behavioral therapy (CBT) teaches you to manage anxiety. It does this by helping you understand how you think and act when you re anxious. Research has shown CBT to be a very effective treatment for anxiety disorders. How CBT is run is almost like a class. It involves homework and activities to build skills that teach you to cope with anxiety step by step. It can be done in a group or one-on-one, and often takes place for a set number of sessions. CBT has two main parts:    Cognitive therapy helps you identify the negative, irrational thoughts that occur with your anxiety. You ll learn to replace these with more positive, realistic thoughts.    Behavioral therapy helps you change how you react to anxiety. You ll learn coping skills and methods for relaxing to help you better deal with anxiety.  Other forms of therapy  Other therapy methods may work better for you than CBT. Or, you may move from CBT to another form of therapy as your treatment needs change. This may mean meeting with a therapist by yourself or in a group. Therapy can also help you work through problems in your life, such as drug or alcohol dependence, that may be making your anxiety worse.  Getting better takes time  Therapy will help you feel better and teach you skills to help manage anxiety long term. But change doesn t  happen right away. It takes a commitment from you. And treatment only works if you learn to face the causes of your anxiety. So, you might feel worse before you feel better. This can sometimes make it hard to stick with it. But remember: Therapy is a very effective treatment. The results will be well worth it.  Helping yourself  If anxiety is wearing you down, here are some things you can do to cope:    Check with your doctor and rule out any physical problems that may be causing the anxiety symptoms.    If an anxiety disorder is diagnosed, seek mental healthcare. This is an illness and it can respond to treatment. Most types of anxiety disorders will respond to talk therapy and medicine.    Educate yourself about anxiety disorders. Keep track of helpful online resources and books you can use during stressful periods.    Try stress management techniques such as meditation.    Consider online or in-person support groups.    Don t fight your feelings. Anxiety feeds itself. The more you worry about it, the worse it gets. Instead, try to identify what might have triggered your anxiety. Then try to put this threat in perspective.    Keep in mind that you can t control everything about a situation. Change what you can and let the rest take its course.    Exercise -- it s a great way to relieve tension and help your body feel relaxed.    Examine your life for stress, and try to find ways to reduce it.    Avoid caffeine and nicotine, which can make anxiety symptoms worse.    Fight the temptation to turn to alcohol or unprescribed drugs for relief. They only make things worse in the long run.   [employer] last reviewed this educational content on 1/1/2017 2000-2020 [employer]. [address], [place] [address]. All rights reserved. This information is not intended as a substitute for professional medical care. Always follow your healthcare professional's instructions.           Patient Education      Treating Anxiety Disorders with Medicine  An anxiety disorder can make you feel nervous or apprehensive, even without a clear reason. In people age 65 and older, generalized anxiety disorder is one of the most commonly diagnosed anxiety disorders. Many times it occurs with depression. Certain anxiety disorders can cause intense feelings of fear or panic. You may even have physical symptoms such as a racing heartbeat, sweating, or dizziness. If you have these feelings, you don t have to suffer anymore. Treatment to help you overcome your fears will likely include therapy (also called counseling). Medicine may also be prescribed to help control your symptoms.     Medicines  Certain medicines may be prescribed to help control your symptoms. So you may feel less anxious. You may also feel able to move forward with therapy. At first, medicines and dosages may need to be adjusted to find what works best for you. Try to be patient. Tell your healthcare provider how a medicine makes you feel. This way, you can work together to find the treatment that s best for you. Keep in mind that medicines can have side effects. Talk with your provider about any side effects that are bothering you. Changing the dose or type of medicine may help. Don t stop taking medicine on your own. That can cause symptoms to come back or cause dangerous withdrawal symptoms.     Anti-anxiety medicine. This medicine eases symptoms and helps you relax. Your healthcare provider will explain when and how to use it. It may be prescribed for use before situations that make you anxious. You may also be told to take medicine on a regular schedule. Anti-anxiety medicine may make you feel a little sleepy or  out of it.  Don t drive a car or operate machinery while on this medicine, until you know how it affects you.  Never use alcohol or other drugs with anti-anxiety medicines. This could result in loss of muscular control, sedation, coma, or death. Also,  use only the amount of medicine prescribed for you. If you think you may have taken too much, get emergency care right away. Never share your medicines with others. Store these medicines in a safe place that can't be reached by children or visitors.   Keep taking medicines as prescribed  Never change your dosage, share or use another person's medicine, or stop taking your medicines without talking to your healthcare provider first. Keep the following in mind:     Some medicines must be taken on a schedule. Make this part of your daily routine. For instance, always take your pill before brushing your teeth. A pillbox can help you remember if you ve taken your medicine each day.    Medicines are often taken for 6 to 12 months. Your healthcare provider will then evaluate whether you need to stay on them. Many people who have also had therapy may no longer need medicine to manage anxiety.    You may need to stop taking medicine slowly to give your body time to adjust. When it s time to stop, your healthcare provider will tell you more. Remember: Never stop taking your medicine without talking to your provider first.    If symptoms return, you may need to start taking medicines again.  This isn t your fault. It s just the nature of your anxiety disorder.  What to think about    Side effects. Medicines may cause side effects. Ask your healthcare provider or pharmacist what you can expect. They may have ideas for avoiding some side effects.    Sexual problems. Some antidepressants can affect your desire for sex or your ability to have an orgasm. A change in dosage or medicine often solves the problem. If you have a sexual side effect that concerns you, tell your healthcare provider.    Addiction. If you ve never had a problem with drugs or alcohol, you may not have a problem with medicines used to treat anxiety disorders. But always discuss the medicines with your healthcare provider before taking them. If you have a  history of addiction, you may not be able to use certain medicines used to treat anxiety disorders.    Medicine interactions. Always check with your pharmacist before using any over-the-counter medicines (OTCs), including herbal supplements. Some OTCs may interact with your anti-anxiety medicines and increase or decrease their effectiveness.    [employer] last reviewed this educational content on 12/1/2019 2000-2020 [employer]. [address] [place] [address], [place] [address]. All rights reserved. This information is not intended as a substitute for professional medical care. Always follow your healthcare professional's instructions.

## 2020-11-22 NOTE — PATIENT INSTRUCTIONS
Dear Antonia De La Cruz,    Your symptoms show that you may have coronavirus (COVID-19). This illness can cause fever, cough and trouble breathing. Many people get a mild case and get better on their own. Some people can get very sick.    Will I be tested for COVID-19?  We would like to test you for Covid-19 virus. I have placed orders for this test.   To schedule: go to your [employer] home page and scroll down to the section that says  You have an appointment that needs to be scheduled  and click the large green button that says  Schedule Now  and follow the steps to find the next available openings.    If you are unable to complete these [employer] scheduling steps, please call [phone] to schedule your testing.     When it's time for your COVID test:  Stay at least 6 feet away from others. (If someone will drive you to your test, stay in the backseat, as far away from the  as you can.)  Cover your mouth and nose with a mask, tissue or washcloth.  Go straight to the testing site. Don't make any stops on the way there or back.    Starting now:     Do not go to work. Follow your usual processes for taking time away from work.  o If you receive a negative COVID-19 test result and were NOT exposed to someone with a known positive COVID-19 test, you can return to work once you're free of fever for 24 hours without fever-reducing medication and your symptoms are improving or resolved.  o If you receive a positive COVID-19 test result, return to work should be at least 10 days from symptom onset (20 days if people have immune compromise) and people should be fever-free for 24 hours without medications, and the respiratory symptoms should be improved significantly before returning to work or school  o If you were exposed to someone who has tested positive for COVID-19, you can return to work 14 days after your last contact with the positive individual, provided you do not have symptoms at all during that  "time.    During this time, don't leave the house except for testing or medical care.  o Stay in your own room, even for meals. Use your own bathroom if you can.  o Stay away from others in your home. No hugging, kissing or shaking hands. No visitors.  o Don't go to work, school or anywhere else.    Clean \"high touch\" surfaces often (doorknobs, counters, handles, etc.). Use a household cleaning spray or wipes. You'll find a full list of  on the EPA website: www.epa.gov/pesticide-registration/list-n-disinfectants-use-against-sars-cov-2.    Cover your mouth and nose with a mask, tissue or washcloth to avoid spreading germs.    Wash your hands and face often. Use soap and water.    People in these groups are at risk for severe illness due to COVID-19:  o People 65 years and older  o People who live in a nursing home or long-term care facility  o People with chronic disease (lung, heart, cancer, diabetes, kidney, liver, immunologic)  o People who have a weakened immune system, including those who:  - Are in cancer treatment  - Take medicine that weakens the immune system, such as corticosteroids  - Had a bone marrow or organ transplant  - Have an immune deficiency  - Have poorly controlled HIV or AIDS  - Are obese (body mass index of 40 or higher)  - Smoke regularly      Caregivers should wear gloves while washing dishes, handling laundry and cleaning bedrooms and bathrooms.    Use caution when washing and drying laundry: Don't shake dirty laundry, and use the warmest water setting that you can.    For more tips, go to www.cdc.gov/coronavirus/2019-ncov/downloads/10Things.pdf.    Sign up for [email or web address]. We know it's scary to hear that you might have COVID-19. We want to track your symptoms to make sure you're okay over the next 2 weeks. Please look for an email from [employer]--this is a free, online program that we'll use to keep in touch. To sign up, follow the link in the email you will receive. Learn more " at [email or web address]    How can I take care of myself?    Get lots of rest. Drink extra fluids (unless a doctor has told you not to)    Take Tylenol (acetaminophen) for fever or pain. If you have liver or kidney problems, ask your family doctor if it's okay to take Tylenol.  Adults can take either:    650 mg (two 325 mg pills) every 4 to 6 hours, or     1,000 mg (two 500 mg pills) every 8 hours as needed.    Note: Don't take more than 3,000 mg in one day. Acetaminophen is found in many medicines (both prescribed and over-the-counter medicines). Read all labels to be sure you don't take too much.  For children, check the Tylenol bottle for the right dose. The dose is based on the child's age or weight.    If you have other health problems (like cancer, heart failure, an organ transplant or severe kidney disease): Call your specialty clinic if you don't feel better in the next 2 days.    Know when to call 911. Emergency warning signs include:  Trouble breathing or shortness of breath  Pain or pressure in the chest that doesn't go away  Feeling confused like you haven't felt before, or not being able to wake up  Bluish-colored lips or face    Where can I get more information?    [facility] - About COVID-19: [email or web address]  CDC - What to Do If You're Sick: www.cdc.gov/coronavirus/2019-ncov/about/steps-when-sick.html    November 22, 2020    RE:  Antonia De La Cruz                                                                                                                                                       [address]     [facility] [address]        To whom it may concern:    I evaluated Antonia De La Cruz on November 22, 2020. Antonia De La Cruz should be excused from work/school.     Return to work should be at least 10 days from when symptoms first started (20 days if immunocompromised) and people should be fever-free for 24 hours without medications, and the  respiratory symptoms should be improved significantly before returning to work or school.       Sincerely,  Elisha Valadez MD

## 2020-11-24 DIAGNOSIS — Z20.822 SUSPECTED COVID-19 VIRUS INFECTION: ICD-10-CM

## 2020-11-24 PROCEDURE — U0003 INFECTIOUS AGENT DETECTION BY NUCLEIC ACID (DNA OR RNA); SEVERE ACUTE RESPIRATORY SYNDROME CORONAVIRUS 2 (SARS-COV-2) (CORONAVIRUS DISEASE [COVID-19]), AMPLIFIED PROBE TECHNIQUE, MAKING USE OF HIGH THROUGHPUT TECHNOLOGIES AS DESCRIBED BY CMS-2020-01-R: HCPCS | Performed by: FAMILY MEDICINE

## 2020-11-25 LAB
SARS-COV-2 RNA SPEC QL NAA+PROBE: ABNORMAL
SPECIMEN SOURCE: ABNORMAL

## 2021-02-12 ENCOUNTER — PATIENT OUTREACH (OUTPATIENT)
Dept: INTERNAL MEDICINE | Facility: CLINIC | Age: 41
End: 2021-02-12

## 2021-02-12 DIAGNOSIS — R87.810 CERVICAL HIGH RISK HPV (HUMAN PAPILLOMAVIRUS) TEST POSITIVE: ICD-10-CM

## 2021-03-17 NOTE — TELEPHONE ENCOUNTER
Patient due for COTEST  Reminder call done today - call cannot be completed. Additional MyChart letter sent.

## 2021-04-25 ENCOUNTER — HEALTH MAINTENANCE LETTER (OUTPATIENT)
Age: 41
End: 2021-04-25

## 2021-10-10 ENCOUNTER — HEALTH MAINTENANCE LETTER (OUTPATIENT)
Age: 41
End: 2021-10-10

## 2022-05-21 ENCOUNTER — HEALTH MAINTENANCE LETTER (OUTPATIENT)
Age: 42
End: 2022-05-21

## 2022-09-18 ENCOUNTER — HEALTH MAINTENANCE LETTER (OUTPATIENT)
Age: 42
End: 2022-09-18

## 2022-11-01 ENCOUNTER — OFFICE VISIT (OUTPATIENT)
Dept: URGENT CARE | Facility: URGENT CARE | Age: 42
End: 2022-11-01
Payer: COMMERCIAL

## 2022-11-01 VITALS
OXYGEN SATURATION: 100 % | RESPIRATION RATE: 20 BRPM | TEMPERATURE: 98.7 F | DIASTOLIC BLOOD PRESSURE: 93 MMHG | WEIGHT: 127 LBS | SYSTOLIC BLOOD PRESSURE: 157 MMHG | BODY MASS INDEX: 26.09 KG/M2 | HEART RATE: 63 BPM

## 2022-11-01 DIAGNOSIS — D50.9 IRON DEFICIENCY ANEMIA, UNSPECIFIED IRON DEFICIENCY ANEMIA TYPE: ICD-10-CM

## 2022-11-01 DIAGNOSIS — K21.00 GASTROESOPHAGEAL REFLUX DISEASE WITH ESOPHAGITIS WITHOUT HEMORRHAGE: Primary | ICD-10-CM

## 2022-11-01 DIAGNOSIS — R11.0 NAUSEA: ICD-10-CM

## 2022-11-01 LAB
ANION GAP SERPL CALCULATED.3IONS-SCNC: 4 MMOL/L (ref 3–14)
BASOPHILS # BLD AUTO: 0 10E3/UL (ref 0–0.2)
BASOPHILS NFR BLD AUTO: 0 %
BUN SERPL-MCNC: 16 MG/DL (ref 7–30)
CALCIUM SERPL-MCNC: 8.9 MG/DL (ref 8.5–10.1)
CHLORIDE BLD-SCNC: 109 MMOL/L (ref 94–109)
CO2 SERPL-SCNC: 28 MMOL/L (ref 20–32)
CREAT SERPL-MCNC: 0.66 MG/DL (ref 0.52–1.04)
EOSINOPHIL # BLD AUTO: 0.1 10E3/UL (ref 0–0.7)
EOSINOPHIL NFR BLD AUTO: 2 %
ERYTHROCYTE [DISTWIDTH] IN BLOOD BY AUTOMATED COUNT: 13 % (ref 10–15)
GFR SERPL CREATININE-BSD FRML MDRD: >90 ML/MIN/1.73M2
GLUCOSE BLD-MCNC: 90 MG/DL (ref 70–99)
HBA1C MFR BLD: 5.7 % (ref 0–5.6)
HCG SERPL QL: NEGATIVE
HCT VFR BLD AUTO: 37.4 % (ref 35–47)
HGB BLD-MCNC: 11.6 G/DL (ref 11.7–15.7)
LYMPHOCYTES # BLD AUTO: 2 10E3/UL (ref 0.8–5.3)
LYMPHOCYTES NFR BLD AUTO: 32 %
MCH RBC QN AUTO: 28.4 PG (ref 26.5–33)
MCHC RBC AUTO-ENTMCNC: 31 G/DL (ref 31.5–36.5)
MCV RBC AUTO: 91 FL (ref 78–100)
MONOCYTES # BLD AUTO: 0.7 10E3/UL (ref 0–1.3)
MONOCYTES NFR BLD AUTO: 11 %
NEUTROPHILS # BLD AUTO: 3.4 10E3/UL (ref 1.6–8.3)
NEUTROPHILS NFR BLD AUTO: 55 %
PLATELET # BLD AUTO: 263 10E3/UL (ref 150–450)
POTASSIUM BLD-SCNC: 3.3 MMOL/L (ref 3.4–5.3)
RBC # BLD AUTO: 4.09 10E6/UL (ref 3.8–5.2)
SODIUM SERPL-SCNC: 141 MMOL/L (ref 133–144)
WBC # BLD AUTO: 6.1 10E3/UL (ref 4–11)

## 2022-11-01 PROCEDURE — 36415 COLL VENOUS BLD VENIPUNCTURE: CPT | Performed by: PHYSICIAN ASSISTANT

## 2022-11-01 PROCEDURE — 83036 HEMOGLOBIN GLYCOSYLATED A1C: CPT | Performed by: PHYSICIAN ASSISTANT

## 2022-11-01 PROCEDURE — 80048 BASIC METABOLIC PNL TOTAL CA: CPT | Performed by: PHYSICIAN ASSISTANT

## 2022-11-01 PROCEDURE — 84703 CHORIONIC GONADOTROPIN ASSAY: CPT | Performed by: PHYSICIAN ASSISTANT

## 2022-11-01 PROCEDURE — 99214 OFFICE O/P EST MOD 30 MIN: CPT | Performed by: PHYSICIAN ASSISTANT

## 2022-11-01 PROCEDURE — 85025 COMPLETE CBC W/AUTO DIFF WBC: CPT | Performed by: PHYSICIAN ASSISTANT

## 2022-11-01 RX ORDER — OMEPRAZOLE 40 MG/1
40 CAPSULE, DELAYED RELEASE ORAL DAILY
Qty: 30 CAPSULE | Refills: 0 | Status: SHIPPED | OUTPATIENT
Start: 2022-11-01 | End: 2023-11-24

## 2022-11-02 NOTE — PROGRESS NOTES
Assessment & Plan     Gastroesophageal reflux disease with esophagitis without hemorrhage    The esophagus is a tube that carries food from the mouth to the stomach. A valve (the LES, lower esophageal sphincter) at the lower end of the esophagus prevents stomach acid from flowing upward. When this valve doesn't work properly, stomach contents may repeatedly flow back up (reflux) into the esophagus. This is called gastroesophageal reflux disease (GERD). GERD can irritate the esophagus. It can cause problems with pain, swallowing or breathing. In severe cases, GERD can cause recurrent pneumonia (from aspiration or breathing in particles) or other serious problems.  Symptoms of reflux include burning, pressure or sharp pain in the upper abdomen or mid to lower chest. The pain can spread to the neck, back, or shoulder. There may be belching, an acid taste in the back of the throat, chronic cough, or sore throat, or hoarseness. GERD symptoms often occur during the day after a big meal. They can also occur at night when lying down.   Home care  Lifestyle changes can help reduce symptoms. If needed, your healthcare provider may prescribe medicines. Symptoms often improve with treatment, but if treatment is stopped, the symptoms often return after a few months. So most persons with GERD will need to continue treatment or get treatment on and off.  Lifestyle changes    Limit or avoid fatty, fried, and spicy foods, as well as coffee, chocolate, mint, and foods with high acid content such as tomatoes and citrus fruit and juices (orange, grapefruit, lemon).    Don t eat large meals, especially at night. Frequent, smaller meals are best. Don't lie down right after eating. And don t eat anything 3 hours before going to bed.    Don't drink alcohol or smoke. As much as possible, stay away from second hand smoke.    If you are overweight, losing weight will reduce symptoms.     Don't wear tight clothing around your stomach  "area.    If your symptoms occur during sleep, use a foam wedge to elevate your upper body (not just your head.) Or, place 4\" blocks under the head of your bed. Or use 2 bed risers under your bedframe.    K+ slightly low, diet modifications  - Basic metabolic panel  (Ca, Cl, CO2, Creat, Gluc, K, Na, BUN); Future  - Basic metabolic panel  (Ca, Cl, CO2, Creat, Gluc, K, Na, BUN)  - omeprazole (PRILOSEC) 40 MG DR capsule; Take 1 capsule (40 mg) by mouth daily    Iron deficiency anemia, unspecified iron deficiency anemia type    Home care  Follow these guidelines when caring for yourself at home:    Eat foods high in iron. This will boost the amount of iron stored in your body. It is a natural way to build up the number of blood cells. Good sources of iron include beef, liver, spinach and other dark green leafy vegetables, whole grains, beans, and nuts.    Don't overexert yourself.    Talk with your healthcare provider before traveling by air or traveling to high altitudes.    Recheck with PCP in 6 months    - CBC with platelets and differential; Future  - CBC with platelets and differential    Nausea    Serum Hcg Is NEG  Cut down on sugars and processed foods    - Hemoglobin A1c; Future  - Basic metabolic panel  (Ca, Cl, CO2, Creat, Gluc, K, Na, BUN); Future  - hCG Qualitative Pregnancy  - Hemoglobin A1c  - Basic metabolic panel  (Ca, Cl, CO2, Creat, Gluc, K, Na, BUN)    Review of external notes as documented elsewhere in note       At today's visit with Antonia De La Cruz , we discussed results, diagnosis, medications and formulated a plan.  We also discussed red flags for immediate return to clinic/ER, as well as indications for follow up with PCP if not improved in 3 days. Patient understood and agreed to plan. Antonia De La Cruz was discharged with stable vitals and has no further questions.       No follow-ups on file.    Dillon Wolf, [facility], PA-C  [facility] URGENT CARE [facility]    Subjective   Antonia De La Cruz is " a 42 year old, presenting for the following health issues:  Nausea, Vomiting (5 times over last 24-48 hrs. Might be related to coke she drank or apples or chips she had eaten?), Gastrointestinal Problem (Epigastric pain), and Consult (Would like Iron level drawn)      HPI   Review of Systems   Constitutional, HEENT, cardiovascular, pulmonary, GI, , musculoskeletal, neuro, skin, endocrine and psych systems are negative, except as otherwise noted.      Objective    BP (!) 157/93   Pulse 63   Temp 98.7  F (37.1  C)   Resp 20   Wt 57.6 kg (127 lb)   SpO2 100%   BMI 26.09 kg/m    Body mass index is 26.09 kg/m .  Physical Exam   GENERAL: healthy, alert and no distress  EYES: Eyes grossly normal to inspection, PERRL and conjunctivae and sclerae normal  HENT: ear canals and TM's normal, nose and mouth without ulcers or lesions  NECK: no adenopathy, no asymmetry, masses, or scars and thyroid normal to palpation  RESP: lungs clear to auscultation - no rales, rhonchi or wheezes  BREAST: normal without masses, tenderness or nipple discharge and no palpable axillary masses or adenopathy  CV: regular rate and rhythm, normal S1 S2, no S3 or S4, no murmur, click or rub, no peripheral edema and peripheral pulses strong  ABDOMEN: soft, nontender, no hepatosplenomegaly, no masses and bowel sounds normal  MS: no gross musculoskeletal defects noted, no edema  SKIN: no suspicious lesions or rashes  NEURO: Normal strength and tone, mentation intact and speech normal  PSYCH: mentation appears normal, affect normal/bright        Results for orders placed or performed in visit on 11/01/22   hCG Qualitative Pregnancy     Status: Normal   Result Value Ref Range    hCG Serum Qualitative Negative Negative   Hemoglobin A1c     Status: Abnormal   Result Value Ref Range    Hemoglobin A1C 5.7 (H) 0.0 - 5.6 %   Basic metabolic panel  (Ca, Cl, CO2, Creat, Gluc, K, Na, BUN)     Status: Abnormal   Result Value Ref Range    Sodium 141 133 - 144  mmol/L    Potassium 3.3 (L) 3.4 - 5.3 mmol/L    Chloride 109 94 - 109 mmol/L    Carbon Dioxide (CO2) 28 20 - 32 mmol/L    Anion Gap 4 3 - 14 mmol/L    Urea Nitrogen 16 7 - 30 mg/dL    Creatinine 0.66 0.52 - 1.04 mg/dL    Calcium 8.9 8.5 - 10.1 mg/dL    Glucose 90 70 - 99 mg/dL    GFR Estimate >90 >60 mL/min/1.73m2   CBC with platelets and differential     Status: Abnormal   Result Value Ref Range    WBC Count 6.1 4.0 - 11.0 10e3/uL    RBC Count 4.09 3.80 - 5.20 10e6/uL    Hemoglobin 11.6 (L) 11.7 - 15.7 g/dL    Hematocrit 37.4 35.0 - 47.0 %    MCV 91 78 - 100 fL    MCH 28.4 26.5 - 33.0 pg    MCHC 31.0 (L) 31.5 - 36.5 g/dL    RDW 13.0 10.0 - 15.0 %    Platelet Count 263 150 - 450 10e3/uL    % Neutrophils 55 %    % Lymphocytes 32 %    % Monocytes 11 %    % Eosinophils 2 %    % Basophils 0 %    Absolute Neutrophils 3.4 1.6 - 8.3 10e3/uL    Absolute Lymphocytes 2.0 0.8 - 5.3 10e3/uL    Absolute Monocytes 0.7 0.0 - 1.3 10e3/uL    Absolute Eosinophils 0.1 0.0 - 0.7 10e3/uL    Absolute Basophils 0.0 0.0 - 0.2 10e3/uL   CBC with platelets and differential     Status: Abnormal    Narrative    The following orders were created for panel order CBC with platelets and differential.  Procedure                               Abnormality         Status                     ---------                               -----------         ------                     CBC with platelets and d...[[id number]]  Abnormal            Final result                 Please view results for these tests on the individual orders.

## 2022-11-07 ENCOUNTER — MYC MEDICAL ADVICE (OUTPATIENT)
Dept: INTERNAL MEDICINE | Facility: CLINIC | Age: 42
End: 2022-11-07

## 2022-11-07 DIAGNOSIS — D50.0 IRON DEFICIENCY ANEMIA DUE TO CHRONIC BLOOD LOSS: ICD-10-CM

## 2022-11-07 NOTE — LETTER
[facility]  [address] [address]  [phone]  November 25, 2022  Antonia De La Cruz  [address] [facility] [address]    Dear Antonia De La Cruz,    I am contacting you regarding the refill request we received for you. After reviewing your chart it looks like you are overdue for your annual and for a med check. Please call [phone] or schedule this through my chart to continue to receive refills. If you anticipate running out before your appointment let us know and we can send in a rafita refill.       Thank you,     [facility] nursing staff

## 2022-11-08 NOTE — TELEPHONE ENCOUNTER
Routing refill request to provider for review/approval because:  A break in medication    Last VV 4/28/2020

## 2022-11-08 NOTE — TELEPHONE ENCOUNTER
She is overdue for her annual exam. Please ask her to schedule this appointment ASAP. Can refill medication temporarily if she anticipates running out prior to scheduled appointment.     Thank you.     ---    May use any virtual or virtual release spot for an in-person visit.     Patient may also be seen at noon (arrival time 11:40am) Mondays, Wednesdays, Thursdays, and Fridays OR at 1:00pm (arrival time 12:40pm) on Thursdays (during the huddle).    Please do not schedule in a same day, next day, or hospital follow-up slot.    Okay to double book lunch slot (but patient should still arrive by 11:40am).

## 2023-06-02 ENCOUNTER — ANCILLARY PROCEDURE (OUTPATIENT)
Dept: GENERAL RADIOLOGY | Facility: CLINIC | Age: 43
End: 2023-06-02
Attending: PHYSICIAN ASSISTANT
Payer: COMMERCIAL

## 2023-06-02 ENCOUNTER — OFFICE VISIT (OUTPATIENT)
Dept: URGENT CARE | Facility: URGENT CARE | Age: 43
End: 2023-06-02
Payer: COMMERCIAL

## 2023-06-02 VITALS
TEMPERATURE: 97.4 F | DIASTOLIC BLOOD PRESSURE: 84 MMHG | BODY MASS INDEX: 26.05 KG/M2 | WEIGHT: 126.8 LBS | OXYGEN SATURATION: 99 % | HEART RATE: 59 BPM | SYSTOLIC BLOOD PRESSURE: 143 MMHG

## 2023-06-02 DIAGNOSIS — M79.652 PAIN OF LEFT THIGH: ICD-10-CM

## 2023-06-02 DIAGNOSIS — M53.3 PAIN IN THE COCCYX: ICD-10-CM

## 2023-06-02 DIAGNOSIS — R10.2 PELVIC PAIN IN FEMALE: Primary | ICD-10-CM

## 2023-06-02 DIAGNOSIS — R31.9 HEMATURIA, UNSPECIFIED TYPE: ICD-10-CM

## 2023-06-02 LAB
ALBUMIN UR-MCNC: NEGATIVE MG/DL
APPEARANCE UR: CLEAR
BACTERIA #/AREA URNS HPF: ABNORMAL /HPF
BILIRUB UR QL STRIP: NEGATIVE
CLUE CELLS: ABNORMAL
COLOR UR AUTO: YELLOW
GLUCOSE UR STRIP-MCNC: NEGATIVE MG/DL
HCG UR QL: NEGATIVE
HGB UR QL STRIP: ABNORMAL
KETONES UR STRIP-MCNC: NEGATIVE MG/DL
LEUKOCYTE ESTERASE UR QL STRIP: NEGATIVE
NITRATE UR QL: NEGATIVE
PH UR STRIP: 6 [PH] (ref 5–7)
RBC #/AREA URNS AUTO: ABNORMAL /HPF
SP GR UR STRIP: 1.02 (ref 1–1.03)
TRICHOMONAS, WET PREP: ABNORMAL
UROBILINOGEN UR STRIP-ACNC: 0.2 E.U./DL
WBC #/AREA URNS AUTO: ABNORMAL /HPF
WBC'S/HIGH POWER FIELD, WET PREP: ABNORMAL
YEAST, WET PREP: ABNORMAL

## 2023-06-02 PROCEDURE — 99213 OFFICE O/P EST LOW 20 MIN: CPT | Performed by: PHYSICIAN ASSISTANT

## 2023-06-02 PROCEDURE — 87491 CHLMYD TRACH DNA AMP PROBE: CPT | Performed by: PHYSICIAN ASSISTANT

## 2023-06-02 PROCEDURE — 72220 X-RAY EXAM SACRUM TAILBONE: CPT | Mod: TC | Performed by: RADIOLOGY

## 2023-06-02 PROCEDURE — 81025 URINE PREGNANCY TEST: CPT | Performed by: PHYSICIAN ASSISTANT

## 2023-06-02 PROCEDURE — 87591 N.GONORRHOEAE DNA AMP PROB: CPT | Performed by: PHYSICIAN ASSISTANT

## 2023-06-02 PROCEDURE — 87086 URINE CULTURE/COLONY COUNT: CPT | Performed by: PHYSICIAN ASSISTANT

## 2023-06-02 PROCEDURE — 87210 SMEAR WET MOUNT SALINE/INK: CPT | Performed by: PHYSICIAN ASSISTANT

## 2023-06-02 PROCEDURE — 81001 URINALYSIS AUTO W/SCOPE: CPT | Performed by: PHYSICIAN ASSISTANT

## 2023-06-02 RX ORDER — NAPROXEN 500 MG/1
500 TABLET ORAL 2 TIMES DAILY WITH MEALS
Qty: 30 TABLET | Refills: 3 | Status: SHIPPED | OUTPATIENT
Start: 2023-06-02 | End: 2023-11-24

## 2023-06-02 RX ORDER — METHOCARBAMOL 750 MG/1
750 TABLET, FILM COATED ORAL 3 TIMES DAILY
Qty: 30 TABLET | Refills: 0 | Status: SHIPPED | OUTPATIENT
Start: 2023-06-02 | End: 2023-11-24

## 2023-06-02 NOTE — PROGRESS NOTES
Assessment & Plan     Pelvic pain in female    UA shows mild blood  Follow up with PCP at next visit for repeat UA  Urine culture pending  STD pending  Wet prep neg    Patient to follow up with PCP as she has ongoing heavy periods and cramping  She has a failed IUD placement and had to have it removed  She should follow back up with GYN    - NEISSERIA GONORRHOEA PCR  - CHLAMYDIA TRACHOMATIS PCR  - Wet prep - lab collect; Future  - UA with Microscopic reflex to Culture  - HCG Qual, Urine ([id number])  - Wet prep - lab collect  - UA Microscopic with Reflex to Culture  - naproxen (NAPROSYN) 500 MG tablet; Take 1 tablet (500 mg) by mouth 2 times daily (with meals)    Pain in the coccyx    Xray Negative for acute findings, read by Dillon Wolf PA-C [facility] at time of visit.  Ice, rest  Naprosyn for tailbone tenderness and intermittent pain    - naproxen (NAPROSYN) 500 MG tablet; Take 1 tablet (500 mg) by mouth 2 times daily (with meals)  - XR Sacrum and Coccyx 2 Views; Future    Hematuria, unspecified type    Follow up with GYN  This could be due to menstrual cycles and heavy irregular bleeding  Advised to have recheck with them  Urine culture pending    - Urine Culture    Pain of left thigh    Alternate warm moist heat and ice  Naprosyn for left thigh tenderness  robaxing for muscle tension  - naproxen (NAPROSYN) 500 MG tablet; Take 1 tablet (500 mg) by mouth 2 times daily (with meals)  - methocarbamol (ROBAXIN) 750 MG tablet; Take 1 tablet (750 mg) by mouth 3 times daily    Review of external notes as documented elsewhere in note       At today's visit with Antonia De La Cruz , we discussed results, diagnosis, medications and formulated a plan.  We also discussed red flags for immediate return to clinic/ER, as well as indications for follow up with PCP if not improved in 3 days. Patient understood and agreed to plan. Antonia De La Cruz was discharged with stable vitals and has no further questions.       No follow-ups on  file.    Dillon Wolf, [facility], PAVANESSA  [facility] URGENT CARE [facility]    Subjective   Antonia De La Cruz is a 43 year old, presenting for the following health issues:  Musculoskeletal Problem         View : No data to display.              HPI   Review of Systems   Constitutional, HEENT, cardiovascular, pulmonary, GI, , musculoskeletal, neuro, skin, endocrine and psych systems are negative, except as otherwise noted.      Objective    BP (!) 143/84   Pulse 59   Temp 97.4  F (36.3  C) (Tympanic)   Wt 57.5 kg (126 lb 12.8 oz)   LMP 05/05/2023 (Approximate)   SpO2 99%   BMI 26.05 kg/m    Body mass index is 26.05 kg/m .  Physical Exam   GENERAL: healthy, alert and no distress  RESP: lungs clear to auscultation - no rales, rhonchi or wheezes  CV: regular rate and rhythm, normal S1 S2, no S3 or S4, no murmur, click or rub, no peripheral edema and peripheral pulses strong  ABDOMEN: soft, nontender, no hepatosplenomegaly, no masses and bowel sounds normal  MS: Positive for mild anterior left thigh tenderness  SKIN: no suspicious lesions or rashes  NEURO: Normal strength and tone, mentation intact and speech normal  PSYCH: mentation appears normal, affect normal/bright    Xray - Reviewed and interpreted by me.  Negative for acute findings, read by Dillon HERNANDEZ at time of visit.    Results for orders placed or performed in visit on 06/02/23   XR Sacrum and Coccyx 2 Views     Status: None    Narrative    EXAM: XR SACRUM AND COCCYX 2 VIEWS  LOCATION: [facility]  DATE/TIME: 6/2/2023 4:39 PM CDT    INDICATION:  Pain in the coccyx  COMPARISON: None.      Impression    IMPRESSION: Normal alignment. No displaced sacral or coccygeal fracture is identified. No sacroiliac or hip joint space narrowing. There is some subarticular sclerosis at the bilateral sacroiliac joints, likely mechanical/degenerative in etiology.   Findings can be better evaluated with MRI if clinically indicated.   Results for  orders placed or performed in visit on 06/02/23   UA with Microscopic reflex to Culture     Status: Abnormal    Specimen: Urine, Midstream   Result Value Ref Range    Color Urine Yellow Colorless, Straw, Light Yellow, Yellow    Appearance Urine Clear Clear    Glucose Urine Negative Negative mg/dL    Bilirubin Urine Negative Negative    Ketones Urine Negative Negative mg/dL    Specific Gravity Urine 1.020 1.003 - 1.035    Blood Urine Small (A) Negative    pH Urine 6.0 5.0 - 7.0    Protein Albumin Urine Negative Negative mg/dL    Urobilinogen Urine 0.2 0.2, 1.0 E.U./dL    Nitrite Urine Negative Negative    Leukocyte Esterase Urine Negative Negative   HCG Qual, Urine ([id number])     Status: Normal   Result Value Ref Range    hCG Urine Qualitative Negative Negative   UA Microscopic with Reflex to Culture     Status: Abnormal   Result Value Ref Range    Bacteria Urine Few (A) None Seen /HPF    RBC Urine 0-2 0-2 /HPF /HPF    WBC Urine 0-5 0-5 /HPF /HPF    Narrative    Urine Culture not indicated   Wet prep - lab collect     Status: Abnormal    Specimen: Vagina; Swab   Result Value Ref Range    Trichomonas Absent Absent    Yeast Absent Absent    Clue Cells Absent Absent    WBCs/high power field 1+ (A) None

## 2023-06-03 LAB
C TRACH DNA SPEC QL NAA+PROBE: NEGATIVE
N GONORRHOEA DNA SPEC QL NAA+PROBE: NEGATIVE

## 2023-06-04 ENCOUNTER — HEALTH MAINTENANCE LETTER (OUTPATIENT)
Age: 43
End: 2023-06-04

## 2023-06-04 LAB — BACTERIA UR CULT: NORMAL

## 2023-06-06 ENCOUNTER — OFFICE VISIT (OUTPATIENT)
Dept: URGENT CARE | Facility: URGENT CARE | Age: 43
End: 2023-06-06
Payer: COMMERCIAL

## 2023-06-06 VITALS
OXYGEN SATURATION: 100 % | SYSTOLIC BLOOD PRESSURE: 153 MMHG | WEIGHT: 124 LBS | RESPIRATION RATE: 18 BRPM | HEART RATE: 60 BPM | BODY MASS INDEX: 25.47 KG/M2 | DIASTOLIC BLOOD PRESSURE: 77 MMHG | TEMPERATURE: 98.2 F

## 2023-06-06 DIAGNOSIS — R03.0 ELEVATED BLOOD PRESSURE READING WITHOUT DIAGNOSIS OF HYPERTENSION: ICD-10-CM

## 2023-06-06 DIAGNOSIS — R51.9 NONINTRACTABLE HEADACHE, UNSPECIFIED CHRONICITY PATTERN, UNSPECIFIED HEADACHE TYPE: ICD-10-CM

## 2023-06-06 DIAGNOSIS — R42 LIGHTHEADEDNESS: Primary | ICD-10-CM

## 2023-06-06 LAB
ANION GAP SERPL CALCULATED.3IONS-SCNC: 5 MMOL/L (ref 3–14)
BUN SERPL-MCNC: 16 MG/DL (ref 7–30)
CALCIUM SERPL-MCNC: 9.1 MG/DL (ref 8.5–10.1)
CHLORIDE BLD-SCNC: 109 MMOL/L (ref 94–109)
CO2 SERPL-SCNC: 28 MMOL/L (ref 20–32)
CREAT SERPL-MCNC: 1 MG/DL (ref 0.52–1.04)
GFR SERPL CREATININE-BSD FRML MDRD: 72 ML/MIN/1.73M2
GLUCOSE BLD-MCNC: 82 MG/DL (ref 70–99)
POTASSIUM BLD-SCNC: 3.4 MMOL/L (ref 3.4–5.3)
SODIUM SERPL-SCNC: 142 MMOL/L (ref 133–144)
TSH SERPL DL<=0.005 MIU/L-ACNC: 1.54 UIU/ML (ref 0.3–4.2)

## 2023-06-06 PROCEDURE — 99214 OFFICE O/P EST MOD 30 MIN: CPT | Performed by: FAMILY MEDICINE

## 2023-06-06 PROCEDURE — 36415 COLL VENOUS BLD VENIPUNCTURE: CPT | Performed by: FAMILY MEDICINE

## 2023-06-06 PROCEDURE — 80048 BASIC METABOLIC PNL TOTAL CA: CPT | Performed by: FAMILY MEDICINE

## 2023-06-06 PROCEDURE — 84443 ASSAY THYROID STIM HORMONE: CPT | Performed by: FAMILY MEDICINE

## 2023-06-06 RX ORDER — MECLIZINE HYDROCHLORIDE 25 MG/1
25 TABLET ORAL 3 TIMES DAILY PRN
Qty: 15 TABLET | Refills: 0 | Status: SHIPPED | OUTPATIENT
Start: 2023-06-06 | End: 2023-06-11

## 2023-06-06 NOTE — PROGRESS NOTES
SUBJECTIVE: Antonia De La Cruz is a 43 year old female presenting with a chief complaint of lightheadedness with intermittent mild ha and slightly elevated BP.  Onset of symptoms was day(s) ago.  Course of illness is waxing and waning.    Current and Associated symptoms: none  Treatment measures tried include None tried.  Predisposing factors include None.    Past Medical History:   Diagnosis Date     Cervical high risk HPV (human papillomavirus) test positive 2/19/2020    See problem list     History of positive PPD     treated with INH in 2019     NO ACTIVE PROBLEMS      No Known Allergies  Social History     Tobacco Use     Smoking status: Never     Smokeless tobacco: Never   Vaping Use     Vaping status: Not on file   Substance Use Topics     Alcohol use: Not Currently       ROS:  SKIN: no rash  GI: no vomiting    OBJECTIVE:  BP (!) 153/77   Pulse 60   Temp 98.2  F (36.8  C)   Resp 18   Wt 56.2 kg (124 lb)   LMP 05/05/2023 (Approximate)   SpO2 100%   BMI 25.47 kg/m  GENERAL APPEARANCE: healthy, alert and no distress  EYES: EOMI,  PERRL, conjunctiva clear  HENT: ear canals and TM's normal.  Nose and mouth without ulcers, erythema or lesions  RESP: lungs clear to auscultation - no rales, rhonchi or wheezes  CV: regular rates and rhythm, normal S1 S2, no murmur noted  NEURO: Normal strength and tone, sensory exam grossly normal,  normal speech and mentation  SKIN: no suspicious lesions or rashes      ICD-10-CM    1. Lightheadedness  R42 Basic metabolic panel     TSH with free T4 reflex     meclizine (ANTIVERT) 25 MG tablet      2. Nonintractable headache, unspecified chronicity pattern, unspecified headache type  R51.9 Basic metabolic panel     TSH with free T4 reflex      3. Elevated blood pressure reading without diagnosis of hypertension  R03.0 Basic metabolic panel     TSH with free T4 reflex        Recheck BP and keep record at different times of day and report readings to PCP  Fluids/Rest, f/u if  worse/not any better

## 2023-11-22 PROBLEM — R87.810 CERVICAL HIGH RISK HPV (HUMAN PAPILLOMAVIRUS) TEST POSITIVE: Status: RESOLVED | Noted: 2020-02-19 | Resolved: 2023-11-22

## 2023-11-24 ENCOUNTER — OFFICE VISIT (OUTPATIENT)
Dept: INTERNAL MEDICINE | Facility: CLINIC | Age: 43
End: 2023-11-24
Payer: COMMERCIAL

## 2023-11-24 VITALS
WEIGHT: 123.9 LBS | DIASTOLIC BLOOD PRESSURE: 80 MMHG | BODY MASS INDEX: 26.73 KG/M2 | SYSTOLIC BLOOD PRESSURE: 120 MMHG | RESPIRATION RATE: 16 BRPM | HEART RATE: 82 BPM | HEIGHT: 57 IN | OXYGEN SATURATION: 100 % | TEMPERATURE: 97.7 F

## 2023-11-24 DIAGNOSIS — R82.90 ABNORMAL URINALYSIS: Primary | ICD-10-CM

## 2023-11-24 DIAGNOSIS — R35.1 NOCTURIA: ICD-10-CM

## 2023-11-24 DIAGNOSIS — Z12.31 ENCOUNTER FOR SCREENING MAMMOGRAM FOR BREAST CANCER: ICD-10-CM

## 2023-11-24 DIAGNOSIS — Z00.00 ROUTINE HISTORY AND PHYSICAL EXAMINATION OF ADULT: Primary | ICD-10-CM

## 2023-11-24 DIAGNOSIS — R03.0 ELEVATED BLOOD PRESSURE READING WITHOUT DIAGNOSIS OF HYPERTENSION: ICD-10-CM

## 2023-11-24 DIAGNOSIS — R73.01 IMPAIRED FASTING GLUCOSE: ICD-10-CM

## 2023-11-24 DIAGNOSIS — Z13.1 SCREENING FOR DIABETES MELLITUS: ICD-10-CM

## 2023-11-24 DIAGNOSIS — Z13.220 SCREENING FOR CHOLESTEROL LEVEL: ICD-10-CM

## 2023-11-24 DIAGNOSIS — R82.90 ABNORMAL URINALYSIS: ICD-10-CM

## 2023-11-24 LAB
ALBUMIN SERPL BCG-MCNC: 4.4 G/DL (ref 3.5–5.2)
ALBUMIN UR-MCNC: 100 MG/DL
ALP SERPL-CCNC: 96 U/L (ref 40–150)
ALT SERPL W P-5'-P-CCNC: 15 U/L (ref 0–50)
ANION GAP SERPL CALCULATED.3IONS-SCNC: 9 MMOL/L (ref 7–15)
APPEARANCE UR: CLEAR
AST SERPL W P-5'-P-CCNC: 24 U/L (ref 0–45)
BACTERIA #/AREA URNS HPF: ABNORMAL /HPF
BILIRUB SERPL-MCNC: 0.3 MG/DL
BILIRUB UR QL STRIP: NEGATIVE
BUN SERPL-MCNC: 13.9 MG/DL (ref 6–20)
CALCIUM SERPL-MCNC: 9.6 MG/DL (ref 8.6–10)
CHLORIDE SERPL-SCNC: 106 MMOL/L (ref 98–107)
CHOLEST SERPL-MCNC: 206 MG/DL
COLOR UR AUTO: YELLOW
CREAT SERPL-MCNC: 0.69 MG/DL (ref 0.51–0.95)
DEPRECATED HCO3 PLAS-SCNC: 26 MMOL/L (ref 22–29)
EGFRCR SERPLBLD CKD-EPI 2021: >90 ML/MIN/1.73M2
GLUCOSE SERPL-MCNC: 85 MG/DL (ref 70–99)
GLUCOSE UR STRIP-MCNC: NEGATIVE MG/DL
HBA1C MFR BLD: 5.6 % (ref 0–5.6)
HDLC SERPL-MCNC: 47 MG/DL
HGB UR QL STRIP: ABNORMAL
KETONES UR STRIP-MCNC: ABNORMAL MG/DL
LDLC SERPL CALC-MCNC: 132 MG/DL
LEUKOCYTE ESTERASE UR QL STRIP: ABNORMAL
NITRATE UR QL: NEGATIVE
NONHDLC SERPL-MCNC: 159 MG/DL
PH UR STRIP: 5 [PH] (ref 5–7)
POTASSIUM SERPL-SCNC: 3.3 MMOL/L (ref 3.4–5.3)
PROT SERPL-MCNC: 7.5 G/DL (ref 6.4–8.3)
RBC #/AREA URNS AUTO: ABNORMAL /HPF
SODIUM SERPL-SCNC: 141 MMOL/L (ref 135–145)
SP GR UR STRIP: >=1.03 (ref 1–1.03)
SQUAMOUS #/AREA URNS AUTO: ABNORMAL /LPF
TRIGL SERPL-MCNC: 135 MG/DL
UROBILINOGEN UR STRIP-ACNC: 0.2 E.U./DL
WBC #/AREA URNS AUTO: ABNORMAL /HPF

## 2023-11-24 PROCEDURE — 99396 PREV VISIT EST AGE 40-64: CPT | Performed by: INTERNAL MEDICINE

## 2023-11-24 PROCEDURE — 80061 LIPID PANEL: CPT | Performed by: INTERNAL MEDICINE

## 2023-11-24 PROCEDURE — 81001 URINALYSIS AUTO W/SCOPE: CPT | Performed by: INTERNAL MEDICINE

## 2023-11-24 PROCEDURE — 87086 URINE CULTURE/COLONY COUNT: CPT | Performed by: INTERNAL MEDICINE

## 2023-11-24 PROCEDURE — 83036 HEMOGLOBIN GLYCOSYLATED A1C: CPT | Performed by: INTERNAL MEDICINE

## 2023-11-24 PROCEDURE — 36415 COLL VENOUS BLD VENIPUNCTURE: CPT | Performed by: INTERNAL MEDICINE

## 2023-11-24 PROCEDURE — 80053 COMPREHEN METABOLIC PANEL: CPT | Performed by: INTERNAL MEDICINE

## 2023-11-24 ASSESSMENT — ENCOUNTER SYMPTOMS
HEARTBURN: 1
BREAST MASS: 0
WEAKNESS: 1
HEADACHES: 1
FREQUENCY: 1
NAUSEA: 1
DIZZINESS: 1
NERVOUS/ANXIOUS: 1

## 2023-11-24 NOTE — COMMUNITY RESOURCES LIST (ENGLISH)
11/24/2023   [facility] - Outpatient Clinics  N/A  For additional resource needs, please contact your health insurance member services or your primary care team.  Phone: [phone]   Email: N/A   Address: [address] [place] [address]   Hours: N/A        Hotlines and Helplines       Hotline - Housing crisis  1  [facility] (Main Office) Distance: 6.32 miles      Phone/Virtual   [address] [place] [address]  Language: English  Hours: Mon - Sun Open 24 Hours   Phone: (441) 913-5951 Email: info@[employer]WithMe Website: http://Sociall.[employer]WithMe     2  [facility] Distance: 10.87 miles      Phone/Virtual   [address] [place] [address]  Language: English  Hours: Mon - Sun Open 24 Hours   Phone: (315) 107-6470 Email: info@[employer] Website: http://www.Sociall.org          Housing       Coordinated Entry access point  3  [facility] [employer] [facility] [place] ([facility] Services Distance: 11.46 miles      In-Person   [address] [place] [address]  Language: English  Hours: Mon - Fri 9:00 AM - 5:00 PM  Fees: Free   Phone: (720) 546-3100 Email: housing@[facility].org Website: http://www.[facility].org/housing     4  [facility] Distance: 12.35 miles      In-Person   [address] [place] [address]  Language: English  Hours: Mon - Sat 10:00 PM - 5:00 PM  Fees: Free   Phone: (986) 914-3486 Email: info@[employer]WithMe Website: http://www.[facility].org/     Drop-in center or day shelter  5  [facility] Distance: 11.78 miles      In-Person   [address] [place] [address]  Language: English  Hours: Mon - Fri 12:00 PM - 3:00 PM  Fees: Free   Phone: (528) 912-4065 Email: [place]@[employer].IGIGI Website: http://[employer].org/     6  [facility] Distance: 12.01 miles      In-Person   [address] [place] [address]  Language: English, Nicaraguan, Ugandan  Hours: Mon - Sat 7:00 AM - 3:00 PM  Fees: Free, Self Pay   Phone: (755) 357-6842 Email: info@[employer] Website: https://www.[employer]/locations/opportunity-center/     Housing search assistance  7  [employer] - Online housing search assistance Distance: 12.39 miles      Phone/Virtual   [address] [place] [address] [place] [address]  Language: English, Hmong, Nicaraguan, Ugandan  Hours: Mon - Sun Open 24 Hours   Phone: (178) 970-6063 Email: info@[employer].org Website: http://www.[employer]/     8  Affordable Housing Online - https://[employer]/ Distance: 12.63 miles      Phone/Virtual   [address] [place] [address]  Language: English  Hours: Mon - Sun Open 24 Hours   Email: info@[employer] Website: https://[employer]     Shelter for individuals  9  [facility] Distance: 11.64 miles      In-Person   [address] [place] [address]  Language: English  Hours: Mon - Sun Open 24 Hours  Fees: Free   Phone: (798) 826-4200 Email: communications@[facility].org Website: https://[facility].org/oursaviourshousing/     10  [facility] Distance: 12.42 miles      In-Person   [address] [place] [address]  Language: English  Hours: Mon - Fri 4:00 PM - 9:00 AM  Fees: Free   Phone: (196) 119-1451 Email: carolyn@[facility].salvationarmy.org Website: https://[place].salvationarmy.org/[facility]/          Important Numbers & Websites       [facility]   [address] 211unitedway.org  Poison Control   (433) 194-2114 [place]  Suicide and Crisis Lifeline   [address] [place], [place] [address]line.org  Childhelp National Child Abuse Hotline   411.451.7864 Childhelphotline.org  National Sexual Assault Hotline   (837) 479-2602 (HOPE) Rainn.org  National Runaway Safeline   (967) 700-4563 (RUNAWAY) 1800runaway.org  Pregnancy & Postpartum Support [place]   Call/text  289-268-8944 Ppsupport[place].org  Substance Abuse National Helpline ([facility]   256-280-HELP (0948) Findtreatment.gov  Emergency Services   [address]

## 2023-11-24 NOTE — COMMUNITY RESOURCES LIST (ENGLISH)
11/24/2023   [facility] [place]  N/A  For questions about this resource list or additional care needs, please contact your primary care clinic or care manager.  Phone: [phone]   Email: N/A   Address: [facility] [place] [address]   Hours: N/A        Hotlines and Helplines       Hotline - Housing crisis  1  [facility] (Main Office) Distance: 6.32 miles      Phone/Virtual   [address] [place] [address]  Language: English  Hours: Mon - Sun Open 24 Hours   Phone: (256) 310-6222 Email: info@[employer] Website: http://[employer]     2  [facility] [place] Distance: 10.87 miles      Phone/Virtual   [address] [place] [address]  Language: English  Hours: Mon - Sun Open 24 Hours   Phone: (245) [phone] Email: info@[employer] Website: http://www.[employer].org          Housing       Coordinated Entry access point  3  [facility] [employer] [facility] [place] ([facility] - Housing Services Distance: 11.46 miles      In-Person   [address] [place] [address]  Language: English  Hours: Mon - Fri 9:00 AM - 5:00 PM  Fees: Free   Phone: (356) 396-9376 Email: housing@[facility].org Website: http://www.[facility].org/housing     4  [facility] Distance: 12.35 miles      In-Person   [address] [place] [address]  Language: English  Hours: Mon - Sat 10:00 PM - 5:00 PM  Fees: Free   Phone: (444) 394-3263 Email: info@[employer]ola.[employer] Website: http://www.[facility].org/     Drop-in center or day shelter  5  [facility] Distance: 11.78 miles      In-Person   [address] [place] [address]  Language: English  Hours: Mon - Fri 12:00 PM - 3:00 PM  Fees: Free   Phone: (604) 397-8629 Email: [employer] Website: http://[employer].org/     6  [facility] Distance: 12.01 miles      In-Person   [address] [place] [address]  Language: English, Citizen of [place], Serbian  Hours: Mon - Sat 7:00 AM - 3:00 PM  Fees: Free, Self Pay   Phone: (199) 336-7826 Email: info@[employer] Website: https://www.[employer]/locations/opportunity-center/     Housing search assistance  7  [place] [employer] [facility] [employer] - Rental Homes for Future Homebuyers Program Distance: 3.47 miles      Phone/Virtual   [address] [place] [address]  Language: English  Hours: Mon - Fri 8:00 AM - 4:30 PM  Fees: Free   Phone: (715) 179-9355 Email: hra@[facility] Website: https://www.[facility]/hra/[place]-housing-and-qyewwtevpebvk-wkhglyblx-cdy     8  [employer] Distance: 10.44 miles      In-Person   [address] [place] [address]  Language: English  Hours: Mon - Fri 8:00 AM - 5:00 PM  Fees: Insurance, Self Pay   Phone: (254) 857-8163 Email: Jose Angel@[employer] Website: http://www.[employer]ray[employer]unched/     Shelter for individuals  9  [facility] Distance: 11.64 miles      In-Person   [address] [place] [address]  Language: English  Hours: Mon - Sun Open 24 Hours  Fees: Free   Phone: (306) 415-7482 Email: communications@[facility].org Website: https://[facility]-[place].org/oursaviourshousing/     10  [facility] Distance: 12.42 miles      In-Person   [address] [place] [address]  Language: English  Hours: Mon - Fri 4:00 PM - 9:00 AM  Fees: Free   Phone: (923) 251-2971 Email: carolyn@[facility].org Website: https://centralusa.[facility].org/northern/[facility]/          Important Numbers & Websites       Emergency Services   911  [facility] Services   [facility]  Poison Control   (522) 953-5478  Suicide Prevention Lifeline   (207) 777-4516 (TALK)  Child Abuse Hotline   (092) 041-6259 (4-A-Child)  Sexual Assault Hotline   (196) 506-5872 (HOPE)  National Runaway Safeline   (663) 329-3684 (RUNAWAY)  All-Options Talkline   (217)  177-9014  Substance Abuse Referral   (726) 654-1660 (HELP)

## 2023-11-24 NOTE — PROGRESS NOTES
ASSESSMENT/PLAN                                                       (Z00.00) Routine history and physical examination of adult  (primary encounter diagnosis)  Comment: PMH, PSH, FH, SH, medications, allergies, immunizations, and preventative health measures reviewed and updated as appropriate.  Plan: see below for plans.      (Z12.31) Encounter for screening mammogram for breast cancer  Plan: screening mammogram ordered - patient to schedule.     (R03.0) Elevated blood pressure reading without diagnosis of hypertension  Plan: 24 Hour Blood Pressure Monitor - Adult ordered - patient to schedule.     (Z13.220) Screening for cholesterol level  (Z13.1) Screening for diabetes mellitus  (R73.01) Impaired fasting glucose  Plan: non-fasting labs today.     (R35.1) Nocturia  Plan: UA reflex today.    Nora Morataya MD   [address] [address]  T: [phone], F: [phone]    RAYRAY De La Cruz is a very pleasant 43 year old female who presents for a physical.    Patient reports elevated blood pressures at work.  No history of or treatment for high blood pressure in the past. She is asymptomatic from a blood pressure perspective: no chest pain or palpitations, no shortness of breath, no light-headedness or dizziness, no headaches or vision changes.     ROS:  Constitutional: no unintentional weight loss or gain reported; no fevers, chills, or sweats reported  Cardiovascular: no chest pain, palpitations, or edema reported  Respiratory: no cough, wheezing, shortness of breath, or dyspnea on exertion reported  Gastrointestinal: no nausea, vomiting, constipation, diarrhea, or abdominal pain reported  Genitourinary: no urinary frequency, urgency, dysuria, or hematuria reported  Integumentary: no rash or pruritus reported  Musculoskeletal: no back pain, muscle pain, joint pain, or joint swelling reported  Neurologic: no  focal weakness, numbness, or tingling reported  Hematologic: no easy bruising or bleeding reported  Endocrine: no heat or cold intolerance reported; no polyuria or polydipsia reported  Psychiatric: no anxiety or depression reported    Past Medical History:   Diagnosis Date    History of positive PPD     treated with INH in 2019     Past Surgical History:   Procedure Laterality Date    NO HISTORY OF SURGERY       Family History   Problem Relation Age of Onset    Cerebrovascular Disease Mother     Diabetes Type 2  Father     Hypertension Father     Hyperlipidemia Father     Cerebrovascular Disease Father     Myocardial Infarction No family hx of     Coronary Artery Disease Early Onset No family hx of     Breast Cancer No family hx of     Ovarian Cancer No family hx of     Colon Cancer No family hx of      Social History     Occupational History    Occupation: CNA     Employer: [employer]   Tobacco Use    Smoking status: Never    Smokeless tobacco: Never   Vaping Use    Vaping Use: Never used   Substance and Sexual Activity    Alcohol use: Not Currently    Drug use: Not Currently    Sexual activity: Yes     Partners: Male     Birth control/protection: Pull-out method   Social History Narrative    .    Two kids (14 and 11 as of 2023).    Active job; no formal exercise.      No Known Allergies    Immunization History   Administered Date(s) Administered    Influenza Vaccine >6 months,quad, PF 11/12/2019    Mantoux Tuberculin Skin Test 02/19/2020    TDAP Vaccine (Adacel) 02/19/2020     PREVENTATIVE HEALTH                                                      BMI: overweight  Blood pressure: within normal limits   Breast CA screening: DUE  Cervical CA screening: up to date   Colon CA screening: not medically indicated at this time   Lung CA screening: n/a   Dexa: not medically indicated at this time   Screening cholesterol: DUE  Screening diabetes: DUE  STD testing: no risk factors present  Alcohol misuse  "screening: alcohol use reviewed - no intervention indicated at this time  Immunizations: reviewed; up to date     OBJECTIVE                                                      /80 (BP Location: Left arm, Patient Position: Sitting, Cuff Size: Adult Regular)   Pulse 82   Temp 97.7  F (36.5  C) (Temporal)   Resp 16   Ht 1.435 m (4' 8.5\")   Wt 56.2 kg (123 lb 14.4 oz)   LMP 11/22/2023   SpO2 100%   BMI 27.29 kg/m    Constitutional: well-appearing  Head, Ears, and Eyes: normocephalic; normal external auditory canal and pinna; tympanic membranes visualized and normal; normal lids and conjunctivae  Neck: supple, symmetric, no thyromegaly or lymphadenopathy  Respiratory: normal respiratory effort; clear to auscultation bilaterally  Cardiovascular: regular rate and rhythm; no edema  Gastrointestinal: soft, non-tender, and non-distended; no organomegaly or masses  Musculoskeletal: normal gait and station  Psych: normal judgment and insight; normal mood and affect; recent and remote memory intact    ---  (Note was completed, in part, with Relead voice-recognition software. Documentation was reviewed, but some grammatical, spelling, and word errors may remain.)    "

## 2023-11-24 NOTE — COMMUNITY RESOURCES LIST (ENGLISH)
11/24/2023   [facility]  N/A  For questions about this resource list or additional care needs, please contact your primary care clinic or care manager.  Phone: [phone]   Email: N/A   Address: [facility] [place] [address]   Hours: N/A        Hotlines and Helplines       Hotline - Housing crisis  1  [facility] (Main Office) Distance: 6.32 miles      Phone/Virtual   [address] [place] [address]  Language: English  Hours: Mon - Sun Open 24 Hours   Phone: (193) 694-5284 Email: info@[employer] Website: http://51.com.D.[employer]     2  [facility] Distance: 10.87 miles      Phone/Virtual   [address] [place] [address]  Language: English  Hours: Mon - Sun Open 24 Hours   Phone: (849) 678-4667 Email: info@[employer] Website: http://www.51.com.org          Housing       Coordinated Entry access point  3  [facility] [employer] [facility] [place] ([facility] - Housing Services Distance: 11.46 miles      In-Person   [address] [place] [address]  Language: English  Hours: Mon - Fri 9:00 AM - 5:00 PM  Fees: Free   Phone: (468) 896-5388 Email: housing@[facility].org Website: http://www.[facility].org/housing     4  [facility] - Adult long term [facility] Distance: 12.35 miles      In-Person   [address] [place] [address]  Language: English  Hours: Mon - Sat 10:00 PM - 5:00 PM  Fees: Free   Phone: (600) 989-1094 Email: info@[employer]ola.[employer] Website: http://www.[facility].org/     Drop-in center or day shelter  5  [facility] Distance: 11.78 miles      In-Person   [address] [place] [address]  Language: English  Hours: Mon - Fri 12:00 PM - 3:00 PM  Fees: Free   Phone: (500) 784-6437 Email: [employer] Website: http://[employer].org/     6  [facility] Distance: 12.01 miles      In-Person   [address] [place] [address]  Language: English, British, Mongolian  Hours: Mon - Sat 7:00 AM - 3:00 PM  Fees: Free, Self Pay   Phone: (395) 510-1894 Email: info@[employer] Website: https://www.[employer]/locations/opportunity-center/     Housing search assistance  7  [place] [employer] - Rental Homes for Future Homebuyers Program Distance: 3.47 miles      Phone/Virtual   [address] [place] [address]  Language: English  Hours: Mon - Fri 8:00 AM - 4:30 PM  Fees: Free   Phone: (839) 189-3899 Email: hra@[facility] Website: https://www.[facility]/hra/[place]-housing-and-tibomoejcfdnq-qyuyegdyy-jhz     8  [employer] Distance: 10.44 miles      In-Person   [address] [place] [address]  Language: English  Hours: Mon - Fri 8:00 AM - 5:00 PM  Fees: Insurance, Self Pay   Phone: (901) 694-9629 Email: Jose Angel@[employer] Website: http://www.[employer]/     Shelter for individuals  9  [facility] Distance: 11.64 miles      In-Person   [address] [place] [address]  Language: English  Hours: Mon - Sun Open 24 Hours  Fees: Free   Phone: (880) 871-9611 Email: communications@[facility].org Website: https://[facility].org/oursaviourshousing/     10  [facility] Distance: 12.42 miles      In-Person   [address] [place] [address]  Language: English  Hours: Mon - Fri 4:00 PM - 9:00 AM  Fees: Free   Phone: (860) 270-1921 Email: carolyn@[facility].org Website: https://centralusa.[facility].org/northern/[facility]/          Important Numbers & Websites       Emergency Services   911  [facility] Services   [facility]  Poison Control   (603) 842-3549  Suicide Prevention Lifeline   (866) 960-7569 (TALK)  Child Abuse Hotline   (976) 512-5789 (4-A-Child)  Sexual Assault Hotline   (506) 461-5107 (HOPE)  National Runaway Safeline   (297) 960-1641 (RUNAWAY)  All-Options Talkline   (366)  564-6349  Substance Abuse Referral   (338) 294-1588 (HELP)

## 2023-11-25 LAB — BACTERIA UR CULT: NORMAL

## 2023-11-30 ENCOUNTER — ANCILLARY PROCEDURE (OUTPATIENT)
Dept: MAMMOGRAPHY | Facility: CLINIC | Age: 43
End: 2023-11-30
Attending: INTERNAL MEDICINE
Payer: COMMERCIAL

## 2023-11-30 DIAGNOSIS — Z12.31 ENCOUNTER FOR SCREENING MAMMOGRAM FOR BREAST CANCER: ICD-10-CM

## 2023-11-30 DIAGNOSIS — Z12.31 VISIT FOR SCREENING MAMMOGRAM: ICD-10-CM

## 2023-11-30 PROCEDURE — 77063 BREAST TOMOSYNTHESIS BI: CPT | Mod: TC | Performed by: RADIOLOGY

## 2023-11-30 PROCEDURE — 77067 SCR MAMMO BI INCL CAD: CPT | Mod: TC | Performed by: RADIOLOGY

## 2024-01-26 ENCOUNTER — OFFICE VISIT (OUTPATIENT)
Dept: URGENT CARE | Facility: URGENT CARE | Age: 44
End: 2024-01-26
Payer: COMMERCIAL

## 2024-01-26 ENCOUNTER — HOSPITAL ENCOUNTER (OUTPATIENT)
Dept: CT IMAGING | Facility: CLINIC | Age: 44
Discharge: HOME | End: 2024-01-26
Attending: PHYSICIAN ASSISTANT | Admitting: PHYSICIAN ASSISTANT
Payer: COMMERCIAL

## 2024-01-26 VITALS
RESPIRATION RATE: 14 BRPM | OXYGEN SATURATION: 99 % | HEART RATE: 69 BPM | BODY MASS INDEX: 27.88 KG/M2 | DIASTOLIC BLOOD PRESSURE: 83 MMHG | WEIGHT: 126.6 LBS | TEMPERATURE: 98.1 F | SYSTOLIC BLOOD PRESSURE: 147 MMHG

## 2024-01-26 DIAGNOSIS — G44.52 NEW DAILY PERSISTENT HEADACHE: Primary | ICD-10-CM

## 2024-01-26 DIAGNOSIS — G44.52 NEW DAILY PERSISTENT HEADACHE: ICD-10-CM

## 2024-01-26 DIAGNOSIS — M53.80 BACK TIGHTNESS: ICD-10-CM

## 2024-01-26 LAB
ALBUMIN SERPL-MCNC: 3.6 G/DL (ref 3.4–5)
ALP SERPL-CCNC: 102 U/L (ref 40–150)
ALT SERPL W P-5'-P-CCNC: 23 U/L (ref 0–50)
ANION GAP SERPL CALCULATED.3IONS-SCNC: 2 MMOL/L (ref 3–14)
AST SERPL W P-5'-P-CCNC: 27 U/L (ref 0–45)
BASOPHILS # BLD AUTO: 0 10E3/UL (ref 0–0.2)
BASOPHILS NFR BLD AUTO: 0 %
BILIRUB SERPL-MCNC: 0.6 MG/DL (ref 0.2–1.3)
BUN SERPL-MCNC: 14 MG/DL (ref 7–30)
CALCIUM SERPL-MCNC: 9.6 MG/DL (ref 8.5–10.1)
CHLORIDE BLD-SCNC: 109 MMOL/L (ref 94–109)
CO2 SERPL-SCNC: 29 MMOL/L (ref 20–32)
CREAT SERPL-MCNC: 0.7 MG/DL (ref 0.52–1.04)
CRP SERPL-MCNC: <3 MG/L
EGFRCR SERPLBLD CKD-EPI 2021: >90 ML/MIN/1.73M2
EOSINOPHIL # BLD AUTO: 0.1 10E3/UL (ref 0–0.7)
EOSINOPHIL NFR BLD AUTO: 1 %
ERYTHROCYTE [DISTWIDTH] IN BLOOD BY AUTOMATED COUNT: 12 % (ref 10–15)
ERYTHROCYTE [SEDIMENTATION RATE] IN BLOOD BY WESTERGREN METHOD: 15 MM/HR (ref 0–20)
GLUCOSE BLD-MCNC: 85 MG/DL (ref 70–99)
HCT VFR BLD AUTO: 40.4 % (ref 35–47)
HGB BLD-MCNC: 13.2 G/DL (ref 11.7–15.7)
IMM GRANULOCYTES # BLD: 0 10E3/UL
IMM GRANULOCYTES NFR BLD: 0 %
LYMPHOCYTES # BLD AUTO: 1.8 10E3/UL (ref 0.8–5.3)
LYMPHOCYTES NFR BLD AUTO: 27 %
MCH RBC QN AUTO: 29.9 PG (ref 26.5–33)
MCHC RBC AUTO-ENTMCNC: 32.7 G/DL (ref 31.5–36.5)
MCV RBC AUTO: 92 FL (ref 78–100)
MONOCYTES # BLD AUTO: 0.5 10E3/UL (ref 0–1.3)
MONOCYTES NFR BLD AUTO: 7 %
NEUTROPHILS # BLD AUTO: 4.3 10E3/UL (ref 1.6–8.3)
NEUTROPHILS NFR BLD AUTO: 64 %
PLATELET # BLD AUTO: 289 10E3/UL (ref 150–450)
POTASSIUM BLD-SCNC: 3.9 MMOL/L (ref 3.4–5.3)
PROT SERPL-MCNC: 7.5 G/DL (ref 6.8–8.8)
RBC # BLD AUTO: 4.41 10E6/UL (ref 3.8–5.2)
SODIUM SERPL-SCNC: 140 MMOL/L (ref 135–145)
TSH SERPL DL<=0.005 MIU/L-ACNC: 1.36 UIU/ML (ref 0.3–4.2)
WBC # BLD AUTO: 6.8 10E3/UL (ref 4–11)

## 2024-01-26 PROCEDURE — 70450 CT HEAD/BRAIN W/O DYE: CPT

## 2024-01-26 PROCEDURE — 85652 RBC SED RATE AUTOMATED: CPT | Performed by: PHYSICIAN ASSISTANT

## 2024-01-26 PROCEDURE — 86140 C-REACTIVE PROTEIN: CPT | Performed by: PHYSICIAN ASSISTANT

## 2024-01-26 PROCEDURE — 84443 ASSAY THYROID STIM HORMONE: CPT | Performed by: PHYSICIAN ASSISTANT

## 2024-01-26 PROCEDURE — 36415 COLL VENOUS BLD VENIPUNCTURE: CPT | Performed by: PHYSICIAN ASSISTANT

## 2024-01-26 PROCEDURE — 80053 COMPREHEN METABOLIC PANEL: CPT | Performed by: PHYSICIAN ASSISTANT

## 2024-01-26 PROCEDURE — 85025 COMPLETE CBC W/AUTO DIFF WBC: CPT | Performed by: PHYSICIAN ASSISTANT

## 2024-01-26 PROCEDURE — 99214 OFFICE O/P EST MOD 30 MIN: CPT | Performed by: PHYSICIAN ASSISTANT

## 2024-01-26 RX ORDER — FERROUS SULFATE 325(65) MG
325 TABLET ORAL
COMMUNITY
Start: 2023-03-30 | End: 2024-04-01

## 2024-01-26 RX ORDER — BUTALBITAL, ACETAMINOPHEN AND CAFFEINE 50; 325; 40 MG/1; MG/1; MG/1
1 TABLET ORAL EVERY 4 HOURS PRN
Qty: 15 TABLET | Refills: 0 | Status: SHIPPED | OUTPATIENT
Start: 2024-01-26

## 2024-01-26 NOTE — PROGRESS NOTES
Assessment & Plan     New daily persistent headache    Due to new onset daily headache we have obtained a CT scan of her head    CT scan of brain is neg for mass, hemorrhage  CBC neg for anemia  CMP neg for diabetes, renal or hepatic concernes  TSH pending    Headaches have many possible causes. Most headaches aren't a sign of a more serious problem, and they will get better on their own. Home treatment may help you feel better faster.  The doctor has checked you carefully, but problems can develop later. If you notice any problems or new symptoms, get medical treatment right away.  Follow-up care is a key part of your treatment and safety. Be sure to make and go to all appointments, and call your doctor if you are having problems. It's also a good idea to know your test results and keep a list of the medicines you take.  How can you care for yourself at home?  Rest in a quiet, dark room until your headache is gone. Close your eyes and try to relax or go to sleep. Don't watch TV or read.  Put a cold, moist cloth or cold pack on the painful area for 10 to 20 minutes at a time. Put a thin cloth between the cold pack and your skin.  Use a warm, moist towel or a heating pad set on low to relax tight shoulder and neck muscles.  Have someone gently massage your neck and shoulders.  Take pain medicines exactly as directed.  If the doctor gave you a prescription medicine for pain, take it as prescribed.  If you are not taking a prescription pain medicine, ask your doctor if you can take an over-the-counter medicine.    - butalbital-acetaminophen-caffeine (ESGIC) -40 MG tablet; Take 1 tablet by mouth every 4 hours as needed for headaches    Back tightness    ROM exercises   OTC motrin  Warm moist compresses      - ESR: Erythrocyte sedimentation rate; Future  - CRP, inflammation; Future  - ESR: Erythrocyte sedimentation rate  - CRP, inflammation    Review of external notes as documented elsewhere in  "note    BMI  Estimated body mass index is 27.88 kg/m  as calculated from the following:    Height as of 11/24/23: 1.435 m (4' 8.5\").    Weight as of this encounter: 57.4 kg (126 lb 9.6 oz).       At today's visit with Antonia De La Cruz , we discussed results, diagnosis, medications and formulated a plan.  We also discussed red flags for immediate return to clinic/ER, as well as indications for follow up with PCP if not improved in 3 days. Patient understood and agreed to plan. Antonia De La Cruz was discharged with stable vitals and has no further questions.       No follow-ups on file.    Subjective   Antonia is a 43 year old, presenting for the following health issues:  Urgent Care and Headache (HA in the mornings, Fatigue and blurry vision loss her eye glasses since x 3 weeks - taking Excedrin )    HPI     Review of Systems  Constitutional, neuro, ENT, endocrine, pulmonary, cardiac, gastrointestinal, genitourinary, musculoskeletal, integument and psychiatric systems are negative, except as otherwise noted.      Objective    BP (!) 147/83 (BP Location: Right arm, Patient Position: Sitting, Cuff Size: Adult Regular)   Pulse 69   Temp 98.1  F (36.7  C) (Tympanic)   Resp 14   Wt 57.4 kg (126 lb 9.6 oz)   LMP 01/24/2024   SpO2 99%   Breastfeeding No   BMI 27.88 kg/m    Body mass index is 27.88 kg/m .  Physical Exam   GENERAL: alert and no distress  EYES: Eyes grossly normal to inspection, PERRL and conjunctivae and sclerae normal  HENT: ear canals and TM's normal, nose and mouth without ulcers or lesions  NECK: no adenopathy, no asymmetry, masses, or scars  RESP: lungs clear to auscultation - no rales, rhonchi or wheezes  CV: regular rate and rhythm, normal S1 S2, no S3 or S4, no murmur, click or rub, no peripheral edema  ABDOMEN: soft, nontender, no hepatosplenomegaly, no masses and bowel sounds normal  MS: no gross musculoskeletal defects noted, no edema  SKIN: no suspicious lesions or rashes  NEURO: Normal " strength and tone, mentation intact and speech normal  PSYCH: mentation appears normal, affect normal/bright  LYMPH: no cervical, supraclavicular, axillary, or inguinal adenopathy            Signed Electronically by: Dillon Wolf [facility], BEN  Results for orders placed or performed during the hospital encounter of 01/26/24   CT Head w/o Contrast     Status: None    Narrative    CT SCAN OF THE HEAD WITHOUT CONTRAST   1/26/2024 2:31 PM     HISTORY: New daily persistent headache    TECHNIQUE:  Axial images of the head and coronal reformations without  IV contrast material. Radiation dose for this scan was reduced using  automated exposure control, adjustment of the mA and/or kV according  to patient size, or iterative reconstruction technique.    COMPARISON: None.    FINDINGS: There is no evidence of intracranial hemorrhage, mass, acute  infarct or anomaly. The ventricles are normal in size, shape and  configuration. The brain parenchyma and subarachnoid spaces are  normal. Very minimal cerebellar tonsillar ectopia; this is within  normal limits and does not meet criteria for Chiari I malformation.    The visualized portions of the sinuses and mastoids appear normal  other than small suspected osteomas in the bilateral mastoid tips. The  bony calvarium and bones of the skull base appear intact.       Impression    IMPRESSION:   No evidence of acute intracranial hemorrhage, mass, or  herniation.      ELISE STEWART MD         SYSTEM ID:  [id number]   Results for orders placed or performed in visit on 01/26/24   Comprehensive metabolic panel (BMP + Alb, Alk Phos, ALT, AST, Total. Bili, TP)     Status: Abnormal   Result Value Ref Range    Sodium 140 135 - 145 mmol/L    Potassium 3.9 3.4 - 5.3 mmol/L    Chloride 109 94 - 109 mmol/L    Carbon Dioxide (CO2) 29 20 - 32 mmol/L    Anion Gap 2 (L) 3 - 14 mmol/L    Urea Nitrogen 14 7 - 30 mg/dL    Creatinine 0.70 0.52 - 1.04 mg/dL    GFR Estimate >90 >60 mL/min/1.73m2     Calcium 9.6 8.5 - 10.1 mg/dL    Glucose 85 70 - 99 mg/dL    Alkaline Phosphatase 102 40 - 150 U/L    AST 27 0 - 45 U/L    ALT 23 0 - 50 U/L    Protein Total 7.5 6.8 - 8.8 g/dL    Albumin 3.6 3.4 - 5.0 g/dL    Bilirubin Total 0.6 0.2 - 1.3 mg/dL   ESR: Erythrocyte sedimentation rate     Status: Normal   Result Value Ref Range    Erythrocyte Sedimentation Rate 15 0 - 20 mm/hr   CBC with platelets and differential     Status: None   Result Value Ref Range    WBC Count 6.8 4.0 - 11.0 10e3/uL    RBC Count 4.41 3.80 - 5.20 10e6/uL    Hemoglobin 13.2 11.7 - 15.7 g/dL    Hematocrit 40.4 35.0 - 47.0 %    MCV 92 78 - 100 fL    MCH 29.9 26.5 - 33.0 pg    MCHC 32.7 31.5 - 36.5 g/dL    RDW 12.0 10.0 - 15.0 %    Platelet Count 289 150 - 450 10e3/uL    % Neutrophils 64 %    % Lymphocytes 27 %    % Monocytes 7 %    % Eosinophils 1 %    % Basophils 0 %    % Immature Granulocytes 0 %    Absolute Neutrophils 4.3 1.6 - 8.3 10e3/uL    Absolute Lymphocytes 1.8 0.8 - 5.3 10e3/uL    Absolute Monocytes 0.5 0.0 - 1.3 10e3/uL    Absolute Eosinophils 0.1 0.0 - 0.7 10e3/uL    Absolute Basophils 0.0 0.0 - 0.2 10e3/uL    Absolute Immature Granulocytes 0.0 <=0.4 10e3/uL   CBC with platelets and differential     Status: None    Narrative    The following orders were created for panel order CBC with platelets and differential.  Procedure                               Abnormality         Status                     ---------                               -----------         ------                     CBC with platelets and d...[[id number]]                      Final result                 Please view results for these tests on the individual orders.

## 2024-04-01 ENCOUNTER — MYC REFILL (OUTPATIENT)
Dept: INTERNAL MEDICINE | Facility: CLINIC | Age: 44
End: 2024-04-01
Payer: COMMERCIAL

## 2024-04-01 DIAGNOSIS — D50.0 IRON DEFICIENCY ANEMIA DUE TO CHRONIC BLOOD LOSS: Primary | ICD-10-CM

## 2024-04-01 RX ORDER — FERROUS SULFATE 325(65) MG
325 TABLET ORAL
Qty: 90 TABLET | Refills: 3 | Status: SHIPPED | OUTPATIENT
Start: 2024-04-01

## 2024-06-11 ENCOUNTER — OFFICE VISIT (OUTPATIENT)
Dept: URGENT CARE | Facility: URGENT CARE | Age: 44
End: 2024-06-11
Payer: COMMERCIAL

## 2024-06-11 ENCOUNTER — ANCILLARY PROCEDURE (OUTPATIENT)
Dept: GENERAL RADIOLOGY | Facility: CLINIC | Age: 44
End: 2024-06-11
Attending: PHYSICIAN ASSISTANT
Payer: COMMERCIAL

## 2024-06-11 VITALS
HEART RATE: 71 BPM | OXYGEN SATURATION: 99 % | BODY MASS INDEX: 28.63 KG/M2 | DIASTOLIC BLOOD PRESSURE: 80 MMHG | SYSTOLIC BLOOD PRESSURE: 158 MMHG | WEIGHT: 130 LBS | TEMPERATURE: 97.6 F | RESPIRATION RATE: 16 BRPM

## 2024-06-11 DIAGNOSIS — N39.0 ACUTE UTI: Primary | ICD-10-CM

## 2024-06-11 DIAGNOSIS — M54.9 BACKACHE WITHOUT RADIATION: ICD-10-CM

## 2024-06-11 DIAGNOSIS — N94.6 DYSMENORRHEA: ICD-10-CM

## 2024-06-11 DIAGNOSIS — G89.29 CHRONIC BILATERAL LOW BACK PAIN WITHOUT SCIATICA: ICD-10-CM

## 2024-06-11 DIAGNOSIS — N92.1 MENOMETRORRHAGIA: ICD-10-CM

## 2024-06-11 DIAGNOSIS — M54.50 CHRONIC BILATERAL LOW BACK PAIN WITHOUT SCIATICA: ICD-10-CM

## 2024-06-11 DIAGNOSIS — R05.3 PERSISTENT COUGH FOR 3 WEEKS OR LONGER: ICD-10-CM

## 2024-06-11 LAB
ALBUMIN UR-MCNC: NEGATIVE MG/DL
APPEARANCE UR: CLEAR
BACTERIA #/AREA URNS HPF: ABNORMAL /HPF
BILIRUB UR QL STRIP: NEGATIVE
CLUE CELLS: ABNORMAL
COLOR UR AUTO: YELLOW
GLUCOSE UR STRIP-MCNC: NEGATIVE MG/DL
HCG SERPL QL: NEGATIVE
HGB UR QL STRIP: ABNORMAL
HYALINE CASTS #/AREA URNS LPF: ABNORMAL /LPF
KETONES UR STRIP-MCNC: NEGATIVE MG/DL
LEUKOCYTE ESTERASE UR QL STRIP: NEGATIVE
MUCOUS THREADS #/AREA URNS LPF: PRESENT /LPF
NITRATE UR QL: NEGATIVE
PH UR STRIP: 6 [PH] (ref 5–7)
RBC #/AREA URNS AUTO: ABNORMAL /HPF
SP GR UR STRIP: >=1.03 (ref 1–1.03)
SQUAMOUS #/AREA URNS AUTO: ABNORMAL /LPF
TRICHOMONAS, WET PREP: ABNORMAL
UROBILINOGEN UR STRIP-ACNC: 0.2 E.U./DL
WBC #/AREA URNS AUTO: ABNORMAL /HPF
WBC'S/HIGH POWER FIELD, WET PREP: ABNORMAL
YEAST, WET PREP: ABNORMAL

## 2024-06-11 PROCEDURE — 99214 OFFICE O/P EST MOD 30 MIN: CPT | Performed by: PHYSICIAN ASSISTANT

## 2024-06-11 PROCEDURE — 81001 URINALYSIS AUTO W/SCOPE: CPT | Performed by: PHYSICIAN ASSISTANT

## 2024-06-11 PROCEDURE — 84703 CHORIONIC GONADOTROPIN ASSAY: CPT | Performed by: PHYSICIAN ASSISTANT

## 2024-06-11 PROCEDURE — 71046 X-RAY EXAM CHEST 2 VIEWS: CPT | Mod: TC | Performed by: STUDENT IN AN ORGANIZED HEALTH CARE EDUCATION/TRAINING PROGRAM

## 2024-06-11 PROCEDURE — 36415 COLL VENOUS BLD VENIPUNCTURE: CPT | Performed by: PHYSICIAN ASSISTANT

## 2024-06-11 PROCEDURE — 87210 SMEAR WET MOUNT SALINE/INK: CPT | Performed by: PHYSICIAN ASSISTANT

## 2024-06-11 RX ORDER — METHYLPREDNISOLONE 4 MG
TABLET, DOSE PACK ORAL
Qty: 21 TABLET | Refills: 0 | Status: SHIPPED | OUTPATIENT
Start: 2024-06-11

## 2024-06-11 RX ORDER — CEFDINIR 300 MG/1
300 CAPSULE ORAL 2 TIMES DAILY
Qty: 10 CAPSULE | Refills: 0 | Status: SHIPPED | OUTPATIENT
Start: 2024-06-11 | End: 2024-06-16

## 2024-06-11 NOTE — PATIENT INSTRUCTIONS
(N39.0) Acute UTI  (primary encounter diagnosis)  Comment:   Plan: cefdinir (OMNICEF) 300 MG capsule        Urine culture pending, we will contact you if the urine culture reveals that the bacteria is resistant to this antibiotic.    (R05.3) Persistent cough for 3 weeks or longer  Comment: secondary to allergies   Plan: XR Chest 2 Views        Cetirizine 10mg 1 pill at bedtime every night for the next 3-4 weeks  If not covered by insurance, please purchase this at pharmacy from the shelves.    (M54.9) Backache without radiation  Comment:   Plan: UA Macroscopic with reflex to Microscopic and         Culture - Clinic Collect, Wet prep - Clinic         Collect, UA Microscopic with Reflex to Culture,        Urine Culture Aerobic Bacterial - lab collect            (N94.6) Dysmenorrhea  Comment:   Plan: Ob/Gyn  Referral, HCG qualitative            (N92.1) Menometrorrhagia  Comment:   Plan: Ob/Gyn  Referral, HCG qualitative            (M54.50,  G89.29) Chronic bilateral low back pain without sciatica  Comment:   Plan: Spine  Referral, methylPREDNISolone         (MEDROL DOSEPAK) 4 MG tablet therapy pack            Follow-up with gynecology within the next 3 to 4 weeks hopefully sooner.    Follow-up with orthopedics hopefully within the next couple of weeks as well for the back symptoms.  Keep your follow-up appointment with Dr. Morataya for November.

## 2024-06-11 NOTE — LETTER
June 11, 2024      Antonia De La Cruz  [address]   [place] [address]        To Whom It May Concern:    Antonia De La Cruz was seen in our clinic today for illness.  She may return to work on Friday.      Sincerely,        Raysa GARCIA, PAShadiC

## 2024-06-11 NOTE — PROGRESS NOTES
Patient presents with:  Tailbone Pain: Onset: 6/5/24: Pt has lower back pain that started on Wednesday. The pt has chronic back pain and is very bad since Saturday.   Pt also is concerned with a cough they have had for the last month.     (N39.0) Acute UTI  (primary encounter diagnosis)  Comment:   Plan: cefdinir (OMNICEF) 300 MG capsule        Urine culture pending, we will contact you if the urine culture reveals that the bacteria is resistant to this antibiotic.    (R05.3) Persistent cough for 3 weeks or longer  Comment: secondary to allergies   Plan: XR Chest 2 Views        Cetirizine 10mg 1 pill at bedtime every night for the next 3-4 weeks  If not covered by insurance, please purchase this at pharmacy from the shelves.    (M54.9) Backache without radiation  Comment:   Plan: UA Macroscopic with reflex to Microscopic and         Culture - Clinic Collect, Wet prep - Clinic         Collect, UA Microscopic with Reflex to Culture,        Urine Culture Aerobic Bacterial - lab collect          (N94.6) Dysmenorrhea  Comment:   Plan: Ob/Gyn  Referral, HCG qualitative            (N92.1) Menometrorrhagia  Comment:   Plan: Ob/Gyn  Referral, HCG qualitative            (M54.50,  G89.29) Chronic bilateral low back pain without sciatica  Comment:   Plan: Spine  Referral, methylPREDNISolone         (MEDROL DOSEPAK) 4 MG tablet therapy pack            Follow-up with gynecology within the next 3 to 4 weeks hopefully sooner.    Follow-up with orthopedics hopefully within the next couple of weeks as well for the back symptoms.  Keep your follow-up appointment with Dr. Morataya for November.      At the end of the encounter, I discussed results, diagnosis, medications. Discussed red flags for immediate return to clinic/ER, as well as indications for follow up if no improvement. Patient understood and agreed to plan. Patient was stable for discharge     If not improving or if condition worsens, follow up with  your Primary Care Provider            SUBJECTIVE:   Antonia De La Cruz is a 44 year old female who presents today with the following: back pain and a cough.    She has had the cough for about a month, onset after her [employer] wedding 5/18/24.  (She has been with her partner for 15 years and they have 2 children). No fevers.  Cough is mostly dry.      Back started to bother her on 6/5/24.   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  She has been very busy at work.  Not very many breaks.  Complains of lower back pain/coccyx/sacrum pain.  She has also had mid back discomfort.  Some urinary frequency.      Has seen irregular and painful menses in the past.  LMP 5/22/24      Patient Active Problem List   Diagnosis    History of positive PPD         Past Medical History:   Diagnosis Date    History of positive PPD     treated with INH in 2019         Current Outpatient Medications   Medication Sig Dispense Refill    Multiple Vitamins-Iron (DAILY-KATHI/IRON/BETA-CAROTENE) TABS TAKE 1 TABLET BY MOUTH DAILY. (Patient not taking: Reported on 10/19/2020) 30 tablet 7     Social History     Tobacco Use    Smoking status: Never Smoker    Smokeless tobacco: Never Used   Substance Use Topics    Alcohol use: Not on file     Family History   Problem Relation Age of Onset    Diabetes Mother     Diabetes Father          ROS:    10 point ROS of systems including Constitutional, Eyes, Respiratory, Cardiovascular, Gastroenterology, Genitourinary, Integumentary, Muscularskeletal, Psychiatric ,neurological were all negative except for pertinent positives noted in my HPI       OBJECTIVE:  BP (!) 160/86 (BP Location: Left arm, Patient Position: Sitting, Cuff Size: Adult Regular)   Pulse 71   Temp 97.6  F (36.4  C) (Tympanic)   Resp 16   Wt 59 kg (130 lb)   SpO2 99%   BMI 28.63 kg/m    Physical Exam:  GENERAL APPEARANCE: healthy, alert and no distress  EYES: EOMI,  PERRL, conjunctiva clear  HENT: ear canals and TM's normal.  Nose  and mouth without ulcers, erythema or lesions  NECK: supple, nontender, no lymphadenopathy  RESP: lungs clear to auscultation - no rales, rhonchi or wheezes  CV: regular rates and rhythm, normal S1 S2, no murmur noted  ABDOMEN:  soft, nontender, no HSM or masses and bowel sounds normal  NEURO: Normal strength and tone, sensory exam grossly normal,  normal speech and mentation  SKIN: no suspicious lesions or rashes  Back: Tenderness over sacrum.  No swelling or erythema.  Gait is normal.    Results for orders placed or performed in visit on 06/11/24   XR Chest 2 Views     Status: None    Narrative    CHEST TWO VIEWS 6/11/2024 10:38 AM     HISTORY: Patient complains of a persistent cough for the past month.;  Persistent cough for 3 weeks or longer    COMPARISON: Chest radiograph 9/18/2020       Impression    IMPRESSION: No focal consolidation, pleural effusion or pneumothorax.  Cardiomediastinal silhouette is unremarkable.    MU LING MD         SYSTEM ID:  [id number]   UA Macroscopic with reflex to Microscopic and Culture - Clinic Collect     Status: Abnormal    Specimen: Urine, Midstream   Result Value Ref Range    Color Urine Yellow Colorless, Straw, Light Yellow, Yellow    Appearance Urine Clear Clear    Glucose Urine Negative Negative mg/dL    Bilirubin Urine Negative Negative    Ketones Urine Negative Negative mg/dL    Specific Gravity Urine >=1.030 1.003 - 1.035    Blood Urine Small (A) Negative    pH Urine 6.0 5.0 - 7.0    Protein Albumin Urine Negative Negative mg/dL    Urobilinogen Urine 0.2 0.2, 1.0 E.U./dL    Nitrite Urine Negative Negative    Leukocyte Esterase Urine Negative Negative   HCG qualitative     Status: Normal   Result Value Ref Range    hCG Serum Qualitative Negative Negative   UA Microscopic with Reflex to Culture     Status: Abnormal   Result Value Ref Range    Bacteria Urine Few (A) None Seen /HPF    RBC Urine 2-5 (A) 0-2 /HPF /HPF    WBC Urine 0-5 0-5 /HPF /HPF    Squamous  Epithelials Urine Moderate (A) None Seen /LPF    Mucus Urine Present (A) None Seen /LPF    Hyaline Casts Urine 0-2 (A) None Seen /LPF    Narrative    Urine Culture not indicated   Wet prep - Clinic Collect     Status: Abnormal    Specimen: Vagina; Swab   Result Value Ref Range    Trichomonas Absent Absent    Yeast Absent Absent    Clue Cells Absent Absent    WBCs/high power field 3+ (A) None

## 2024-08-13 ENCOUNTER — OFFICE VISIT (OUTPATIENT)
Dept: OBGYN | Facility: CLINIC | Age: 44
End: 2024-08-13
Attending: PHYSICIAN ASSISTANT
Payer: COMMERCIAL

## 2024-08-13 VITALS
BODY MASS INDEX: 29.06 KG/M2 | WEIGHT: 129.2 LBS | DIASTOLIC BLOOD PRESSURE: 84 MMHG | HEIGHT: 56 IN | SYSTOLIC BLOOD PRESSURE: 128 MMHG

## 2024-08-13 DIAGNOSIS — N39.3 SUI (STRESS URINARY INCONTINENCE, FEMALE): ICD-10-CM

## 2024-08-13 DIAGNOSIS — N93.9 ABNORMAL UTERINE BLEEDING (AUB): Primary | ICD-10-CM

## 2024-08-13 DIAGNOSIS — N94.6 DYSMENORRHEA: ICD-10-CM

## 2024-08-13 DIAGNOSIS — N92.1 MENOMETRORRHAGIA: ICD-10-CM

## 2024-08-13 DIAGNOSIS — R10.2 PELVIC PRESSURE IN FEMALE: ICD-10-CM

## 2024-08-13 LAB
ALBUMIN UR-MCNC: NEGATIVE MG/DL
APPEARANCE UR: CLEAR
BACTERIA #/AREA URNS HPF: ABNORMAL /HPF
BILIRUB UR QL STRIP: NEGATIVE
COLOR UR AUTO: YELLOW
ERYTHROCYTE [DISTWIDTH] IN BLOOD BY AUTOMATED COUNT: 12.6 % (ref 10–15)
GLUCOSE UR STRIP-MCNC: NEGATIVE MG/DL
HCT VFR BLD AUTO: 37.4 % (ref 35–47)
HGB BLD-MCNC: 11.7 G/DL (ref 11.7–15.7)
HGB UR QL STRIP: ABNORMAL
KETONES UR STRIP-MCNC: NEGATIVE MG/DL
LEUKOCYTE ESTERASE UR QL STRIP: NEGATIVE
MCH RBC QN AUTO: 29 PG (ref 26.5–33)
MCHC RBC AUTO-ENTMCNC: 31.3 G/DL (ref 31.5–36.5)
MCV RBC AUTO: 93 FL (ref 78–100)
NITRATE UR QL: NEGATIVE
PH UR STRIP: 5.5 [PH] (ref 5–7)
PLATELET # BLD AUTO: 293 10E3/UL (ref 150–450)
RBC # BLD AUTO: 4.03 10E6/UL (ref 3.8–5.2)
RBC #/AREA URNS AUTO: ABNORMAL /HPF
SP GR UR STRIP: 1.01 (ref 1–1.03)
SQUAMOUS #/AREA URNS AUTO: ABNORMAL /LPF
UROBILINOGEN UR STRIP-ACNC: 0.2 E.U./DL
WBC # BLD AUTO: 6.9 10E3/UL (ref 4–11)
WBC #/AREA URNS AUTO: ABNORMAL /HPF

## 2024-08-13 PROCEDURE — 82728 ASSAY OF FERRITIN: CPT | Performed by: STUDENT IN AN ORGANIZED HEALTH CARE EDUCATION/TRAINING PROGRAM

## 2024-08-13 PROCEDURE — 81001 URINALYSIS AUTO W/SCOPE: CPT | Performed by: STUDENT IN AN ORGANIZED HEALTH CARE EDUCATION/TRAINING PROGRAM

## 2024-08-13 PROCEDURE — G2211 COMPLEX E/M VISIT ADD ON: HCPCS | Performed by: STUDENT IN AN ORGANIZED HEALTH CARE EDUCATION/TRAINING PROGRAM

## 2024-08-13 PROCEDURE — 85027 COMPLETE CBC AUTOMATED: CPT | Performed by: STUDENT IN AN ORGANIZED HEALTH CARE EDUCATION/TRAINING PROGRAM

## 2024-08-13 PROCEDURE — 36415 COLL VENOUS BLD VENIPUNCTURE: CPT | Performed by: STUDENT IN AN ORGANIZED HEALTH CARE EDUCATION/TRAINING PROGRAM

## 2024-08-13 PROCEDURE — 99204 OFFICE O/P NEW MOD 45 MIN: CPT | Performed by: STUDENT IN AN ORGANIZED HEALTH CARE EDUCATION/TRAINING PROGRAM

## 2024-08-13 ASSESSMENT — ANXIETY QUESTIONNAIRES
IF YOU CHECKED OFF ANY PROBLEMS ON THIS QUESTIONNAIRE, HOW DIFFICULT HAVE THESE PROBLEMS MADE IT FOR YOU TO DO YOUR WORK, TAKE CARE OF THINGS AT HOME, OR GET ALONG WITH OTHER PEOPLE: SOMEWHAT DIFFICULT
8. IF YOU CHECKED OFF ANY PROBLEMS, HOW DIFFICULT HAVE THESE MADE IT FOR YOU TO DO YOUR WORK, TAKE CARE OF THINGS AT HOME, OR GET ALONG WITH OTHER PEOPLE?: SOMEWHAT DIFFICULT
3. WORRYING TOO MUCH ABOUT DIFFERENT THINGS: SEVERAL DAYS
4. TROUBLE RELAXING: SEVERAL DAYS
7. FEELING AFRAID AS IF SOMETHING AWFUL MIGHT HAPPEN: SEVERAL DAYS
6. BECOMING EASILY ANNOYED OR IRRITABLE: SEVERAL DAYS
GAD7 TOTAL SCORE: 7
GAD7 TOTAL SCORE: 7
7. FEELING AFRAID AS IF SOMETHING AWFUL MIGHT HAPPEN: SEVERAL DAYS
5. BEING SO RESTLESS THAT IT IS HARD TO SIT STILL: SEVERAL DAYS
1. FEELING NERVOUS, ANXIOUS, OR ON EDGE: SEVERAL DAYS
2. NOT BEING ABLE TO STOP OR CONTROL WORRYING: SEVERAL DAYS
GAD7 TOTAL SCORE: 7

## 2024-08-13 ASSESSMENT — PATIENT HEALTH QUESTIONNAIRE - PHQ9
SUM OF ALL RESPONSES TO PHQ QUESTIONS 1-9: 1
SUM OF ALL RESPONSES TO PHQ QUESTIONS 1-9: 1
10. IF YOU CHECKED OFF ANY PROBLEMS, HOW DIFFICULT HAVE THESE PROBLEMS MADE IT FOR YOU TO DO YOUR WORK, TAKE CARE OF THINGS AT HOME, OR GET ALONG WITH OTHER PEOPLE: NOT DIFFICULT AT ALL

## 2024-08-13 NOTE — PROGRESS NOTES
[facility]  OB/GYN Clinic Note    SUBJECTIVE:                                                   Antonia De La Cruz is a 44 year old  female who presents to clinic today for the following health issue(s):  Patient presents with:  Consult    Additional information: Dysmenorrhea Menometrorrhagia     HPI:  Patient is seen today for heavy, painful periods.   In , periods started to be more heavy. 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   Pads are soaked through. 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   Gets headache and back pain with periods. Always feeling tired.   She is s/p attempted Mirena IUD placement and immediate removal due to pain in 2023 at outside clinic. She was advised to follow-up to discuss ablation vs hysterectomy.   US at that time was unremarkable but adenomyosis was suspected. Get nausea from pills.   Feels like she has UTI symtpoms today. Is always leaking urine and wearing a pad- wondering if this causes irritation leading to UTI.     Patient's last menstrual period was 2024 (exact date)..   Patient is sexually active, .  Using none for contraception.    reports that she has never smoked. She has never used smokeless tobacco.  STD testing offered?  Declined  Health maintenance updated:  yes    Problem list and histories reviewed & adjusted, as indicated.  Additional history: as documented.    Patient Active Problem List   Diagnosis    History of positive PPD     Past Surgical History:   Procedure Laterality Date    NO HISTORY OF SURGERY        Social History     Tobacco Use    Smoking status: Never    Smokeless tobacco: Never   Substance Use Topics    Alcohol use: Not Currently      Problem (# of Occurrences) Relation (Name,Age of Onset)    Hypertension (1) Father    Cerebrovascular Disease (2) Mother, Father    Hyperlipidemia (1) Father    Diabetes Type 2  (1) Father           Negative family  "history of: Myocardial Infarction, Coronary Artery Disease Early Onset, Breast Cancer, Ovarian Cancer, Colon Cancer              Current Outpatient Medications   Medication Sig Dispense Refill    ferrous sulfate (FEROSUL) 325 (65 Fe) MG tablet Take 1 tablet (325 mg) by mouth daily (with breakfast) 90 tablet 3    butalbital-acetaminophen-caffeine (ESGIC) -40 MG tablet Take 1 tablet by mouth every 4 hours as needed for headaches (Patient not taking: Reported on 2024) 15 tablet 0    methylPREDNISolone (MEDROL DOSEPAK) 4 MG tablet therapy pack Follow Package Directions (Patient not taking: Reported on 2024) 21 tablet 0     No current facility-administered medications for this visit.     No Known Allergies        OBJECTIVE:     /84   Ht 1.435 m (4' 8.5\")   Wt 58.6 kg (129 lb 3.2 oz)   LMP 2024 (Exact Date)   BMI 28.46 kg/m    Body mass index is 28.46 kg/m .    Exam:  Constitutional:  Appearance: Well nourished, well developed alert, in no acute distress       ASSESSMENT/PLAN:                                                        ICD-10-CM    1. Abnormal uterine bleeding (AUB)  N93.9 CBC with platelets     Ferritin     US Transvaginal Pelvic Non-OB     CBC with platelets     Ferritin      2. Dysmenorrhea  N94.6 Ob/Gyn  Referral      3. Menometrorrhagia  N92.1 Ob/Gyn  Referral      4. RITESH (stress urinary incontinence, female)  N39.3 Physical Therapy  Referral      5. Pelvic pressure in female  R10.2 UA with Microscopic reflex to Culture - lab collect     UA with Microscopic reflex to Culture - lab collect        Florame B Jono is a 44 year old  with AUB of unclear etiology  TSH reviewed and is normal. US last year is normal. Adenomyosis was previously suspected based on reported history. Recommend CBC/ferritin today.   Failed Mirena IUD. She is not interested in hysterectomy. Discussed alternative medications to treat AUB.   Requests repeat US to evaluate " for structural etiology. Plan to follow-up to discuss US results and management options.  Discussed TXA. Various management options were discussed including non-pill contravceptive options. Has history of intermittently elevated BP without formal diagnosis of hypertension and is not on antihypertensive medications. BP normal today. Discussed risks/benefits of estrogen containing contraceptive. She is open to consider Nuvaring. Would like rx after US is completed.     UA sent today for UTI symptoms. Discussed RITESH/pads. Recommend pelvic floor PT to improve RITESH and reduces needs for daily pad for urine leaking.     Follow-up for US    Dania Rodriguez MD, MHS  Ob/Gyn   08/13/24  Answers submitted by the patient for this visit:  Patient Health Questionnaire (Submitted on 8/13/2024)  If you checked off any problems, how difficult have these problems made it for you to do your work, take care of things at home, or get along with other people?: Not difficult at all  PHQ9 TOTAL SCORE: 1  BO-7 (Submitted on 8/13/2024)  BO 7 TOTAL SCORE: 7

## 2024-08-14 LAB — FERRITIN SERPL-MCNC: 18 NG/ML (ref 6–175)

## 2024-10-25 ENCOUNTER — PATIENT OUTREACH (OUTPATIENT)
Dept: CARE COORDINATION | Facility: CLINIC | Age: 44
End: 2024-10-25
Payer: COMMERCIAL

## 2024-11-08 ENCOUNTER — PATIENT OUTREACH (OUTPATIENT)
Dept: CARE COORDINATION | Facility: CLINIC | Age: 44
End: 2024-11-08
Payer: COMMERCIAL

## 2025-01-02 ENCOUNTER — LAB (OUTPATIENT)
Dept: LAB | Facility: CLINIC | Age: 45
End: 2025-01-02
Payer: COMMERCIAL

## 2025-01-02 DIAGNOSIS — I10 BENIGN ESSENTIAL HYPERTENSION: ICD-10-CM

## 2025-01-02 DIAGNOSIS — E87.6 HYPOKALEMIA: ICD-10-CM

## 2025-01-02 LAB
ANION GAP SERPL CALCULATED.3IONS-SCNC: 11 MMOL/L (ref 7–15)
BUN SERPL-MCNC: 14 MG/DL (ref 6–20)
CALCIUM SERPL-MCNC: 9.3 MG/DL (ref 8.8–10.4)
CHLORIDE SERPL-SCNC: 105 MMOL/L (ref 98–107)
CREAT SERPL-MCNC: 0.68 MG/DL (ref 0.51–0.95)
EGFRCR SERPLBLD CKD-EPI 2021: >90 ML/MIN/1.73M2
GLUCOSE SERPL-MCNC: 108 MG/DL (ref 70–99)
HCO3 SERPL-SCNC: 23 MMOL/L (ref 22–29)
POTASSIUM SERPL-SCNC: 3.3 MMOL/L (ref 3.4–5.3)
SODIUM SERPL-SCNC: 139 MMOL/L (ref 135–145)

## 2025-01-05 LAB — RENIN PLAS-CCNC: 0.9 NG/ML/HR

## 2025-01-12 ENCOUNTER — HEALTH MAINTENANCE LETTER (OUTPATIENT)
Age: 45
End: 2025-01-12

## 2025-03-21 NOTE — PROGRESS NOTES
[facility]  OB/GYN Clinic Note    SUBJECTIVE:                                                   Antonia De La Cruz is a 44 year old  female who presents to clinic today for the following health issue(s):    Additional information: US follow up    HPI:  Seen today for US follow-up of dysmenorrhea and AUB and to discuss management options. Mirena IUD placement was attempted in  and not successful.   Has monthly period. Very painful and heavy. Used to have spotting before period. Now starts heavy for 5 days. Has pain on first day. Taking Tylenol- helps with pain. Sometimes has HA, migraine at onset of period. Migraine is worse than pelvic pain.   Using withdrawal method for contraception. Does not want to pregnant. Has pain with intercourse. Does not have orgasm with intercourse. Last visit discussed pelvic floor PT for urinary issues but was not able to attend.     Patient's last menstrual period was 2025..   Patient is sexually active, .  Using none for contraception.    reports that she has never smoked. She has never used smokeless tobacco.  STD testing offered?  Declined  Health maintenance updated:  yes    OBJECTIVE:     /64   Wt 59 kg (130 lb)   LMP 2025   BMI 28.63 kg/m    Body mass index is 28.63 kg/m .    Exam:  Constitutional:  Appearance: Well nourished, well developed alert, in no acute distress     In-Clinic Test Results:  Results for orders placed or performed in visit on 04/15/25 (from the past 24 hours)   US Transvaginal Pelvic Non-OB    Narrative    Table formatting from the original result was not included.     Gynecological Ultrasound Report  Pelvic U/S - Transvaginal  [facility]  Referring Provider: Dania Rodriguez MD  Sonographer:  Nel Panchal, Registered Diagnostic Medical Sonographer,   RDMS  Indication: Bleeding/Menses- Abnormal uterine bleeding (AUB) and Pain-   Dysmenorrhea  LMP: 25  History:    Gynecological Ultrasonography:   Uterus: retroverted. Adenomyosis appearing myometrium.  Size: 7.58 x 5.57 x 5.21 cm  Endometrium: Thickness Total 13.50 mm  Findings: Irregular, possible polyps.  Right Ovary: 3.28 x 2.24 x 2.06 cm. Wnl  Left Ovary: 2.80 x 2.34 x 1.27 cm. Wnl  Cul de Sac Free Fluid: No free fluid  Technique: Transvaginal Imaging performed  3D imaging performed     Impression:   The uterine contour and size are normal. The ovaries were visualized and   no abnormalities were seen.  The endometrium appeared distorted, cannot rule out polyp.     Dania Rodriguez MD, [facility]  04/15/25          ASSESSMENT/PLAN:                                                        ICD-10-CM    1. Abnormal uterine bleeding (AUB)  N93.9 CBC with platelets     Ferritin     CBC with platelets     Ferritin     Case Request: Operative Hysteroscopy of Uterus using Myosure Morcellator, Insertion of Mirena Intrauterine Device     Case Request: Operative Hysteroscopy of Uterus using Myosure Morcellator, Insertion of Mirena Intrauterine Device      2. Dysmenorrhea  N94.6 Case Request: Operative Hysteroscopy of Uterus using Myosure Morcellator, Insertion of Mirena Intrauterine Device     Case Request: Operative Hysteroscopy of Uterus using Myosure Morcellator, Insertion of Mirena Intrauterine Device      3. Endometrial polyp  N84.0 Case Request: Operative Hysteroscopy of Uterus using Myosure Morcellator, Insertion of Mirena Intrauterine Device     Case Request: Operative Hysteroscopy of Uterus using Myosure Morcellator, Insertion of Mirena Intrauterine Device      4. Pain in female genitalia on intercourse  N94.10         Antonia De La Cruz is a 44 year old  with above. Advise hysteroscopy.   Discussed procedure in detail including risks of infection, bleeding, and injury (perforation, possible need for repeat procedure after uterus heals, possible need for laparoscopy if injury to surrounding tissues is suspected), benefits,  alternatives, duration of procedure, and anticipated recovery. Will need pre-op H&P with PCP.  Advise Mirena IUD placement at time of procedure to manage AUB and dysmenorrhea, and for contraception. She is amenable.   Discussed pelvic floor PT for painful intercourse.   Orders placed for scheduling.     Dania Rodriguez MD, MHS  [facility] [place]  04/15/25

## 2025-04-15 ENCOUNTER — OFFICE VISIT (OUTPATIENT)
Dept: OBGYN | Facility: CLINIC | Age: 45
End: 2025-04-15
Payer: COMMERCIAL

## 2025-04-15 ENCOUNTER — ANCILLARY PROCEDURE (OUTPATIENT)
Dept: ULTRASOUND IMAGING | Facility: CLINIC | Age: 45
End: 2025-04-15
Payer: COMMERCIAL

## 2025-04-15 VITALS — BODY MASS INDEX: 28.63 KG/M2 | DIASTOLIC BLOOD PRESSURE: 64 MMHG | SYSTOLIC BLOOD PRESSURE: 116 MMHG | WEIGHT: 130 LBS

## 2025-04-15 DIAGNOSIS — N94.6 DYSMENORRHEA: ICD-10-CM

## 2025-04-15 DIAGNOSIS — N94.10 PAIN IN FEMALE GENITALIA ON INTERCOURSE: ICD-10-CM

## 2025-04-15 DIAGNOSIS — N93.9 ABNORMAL UTERINE BLEEDING (AUB): Primary | ICD-10-CM

## 2025-04-15 DIAGNOSIS — N84.0 ENDOMETRIAL POLYP: ICD-10-CM

## 2025-04-15 DIAGNOSIS — N93.9 ABNORMAL UTERINE BLEEDING (AUB): ICD-10-CM

## 2025-04-15 LAB
ERYTHROCYTE [DISTWIDTH] IN BLOOD BY AUTOMATED COUNT: 15.1 % (ref 10–15)
HCT VFR BLD AUTO: 31.8 % (ref 35–47)
HGB BLD-MCNC: 9.8 G/DL (ref 11.7–15.7)
MCH RBC QN AUTO: 25.5 PG (ref 26.5–33)
MCHC RBC AUTO-ENTMCNC: 30.8 G/DL (ref 31.5–36.5)
MCV RBC AUTO: 83 FL (ref 78–100)
PLATELET # BLD AUTO: 368 10E3/UL (ref 150–450)
RBC # BLD AUTO: 3.85 10E6/UL (ref 3.8–5.2)
WBC # BLD AUTO: 11 10E3/UL (ref 4–11)

## 2025-04-15 PROCEDURE — 3078F DIAST BP <80 MM HG: CPT | Performed by: STUDENT IN AN ORGANIZED HEALTH CARE EDUCATION/TRAINING PROGRAM

## 2025-04-15 PROCEDURE — 99214 OFFICE O/P EST MOD 30 MIN: CPT | Performed by: STUDENT IN AN ORGANIZED HEALTH CARE EDUCATION/TRAINING PROGRAM

## 2025-04-15 PROCEDURE — 3074F SYST BP LT 130 MM HG: CPT | Performed by: STUDENT IN AN ORGANIZED HEALTH CARE EDUCATION/TRAINING PROGRAM

## 2025-04-15 PROCEDURE — 36415 COLL VENOUS BLD VENIPUNCTURE: CPT | Performed by: STUDENT IN AN ORGANIZED HEALTH CARE EDUCATION/TRAINING PROGRAM

## 2025-04-15 PROCEDURE — 82728 ASSAY OF FERRITIN: CPT | Performed by: STUDENT IN AN ORGANIZED HEALTH CARE EDUCATION/TRAINING PROGRAM

## 2025-04-15 PROCEDURE — 76830 TRANSVAGINAL US NON-OB: CPT | Performed by: STUDENT IN AN ORGANIZED HEALTH CARE EDUCATION/TRAINING PROGRAM

## 2025-04-15 PROCEDURE — 85027 COMPLETE CBC AUTOMATED: CPT | Performed by: STUDENT IN AN ORGANIZED HEALTH CARE EDUCATION/TRAINING PROGRAM

## 2025-04-16 ENCOUNTER — TELEPHONE (OUTPATIENT)
Dept: OBGYN | Facility: CLINIC | Age: 45
End: 2025-04-16

## 2025-04-16 LAB — FERRITIN SERPL-MCNC: 11 NG/ML (ref 6–175)

## 2025-04-16 NOTE — TELEPHONE ENCOUNTER
ERAS BAG GIVEN N/A  ERAS INSTRUCTIONS EXPLAINED N/A    ASSIST: (MD, PA, CNM, NONE) None    H&P TO BE COMPLETED BY:   PCP  FOR H&P TO BE DONE BY SURGEON     SAME DAY/OBSERVATION/INPATIENT: STRAIGHT SAME DAY  EQUIPMENT: None  VENDOR NEEDED AT CASE: No  IF IUD WHAT BRAND Mirena  LABS/SPECIAL INSTRUCTIONS: None  TIME OFF WORK: 1 day  DO YOU ESTIMATE EXTRA TIME NEEDED THAN YOUR AVERAGE ON THIS CASE? No  IF YES HOW MUCH EXTRA TIME IN MINUTES? N/A  POST OP TO BE SCHEDULED: not needed

## 2025-04-23 NOTE — TELEPHONE ENCOUNTER
Type of surgery: OPER HSC w/MYOSURE MIRENA IUD INSERT  Location of surgery: [facility]  Date and time of surgery: 6/17/2025 7:30a  Surgeon: JULIETA   Pre-Op Appt Date: PCP  Post-Op Appt Date: NA   Packet sent out:  MAILED 4/23/2025  Pre-cert/Authorization completed:   TBD  Date: 4/22/2025 Yumiko via CHAZ Gibson  Surgery Scheduler   CPT [id number]  /[id number]

## 2025-05-28 NOTE — TELEPHONE ENCOUNTER
Pt called and would like to CX at this time. She states she needs to figure out her insurance as she just received a bill from  for her last procedure.  Advised pt to call the number for billing on her statement to make sure it was run through her insurance.    Pt will call when she is ready to schedule    Sent msg to [facility] to have case put in depot for later use.    Daphne Gibson  Surgery Scheduler